# Patient Record
Sex: MALE | Race: WHITE | NOT HISPANIC OR LATINO
[De-identification: names, ages, dates, MRNs, and addresses within clinical notes are randomized per-mention and may not be internally consistent; named-entity substitution may affect disease eponyms.]

---

## 2019-02-07 ENCOUNTER — RECORD ABSTRACTING (OUTPATIENT)
Age: 39
End: 2019-02-07

## 2019-02-07 DIAGNOSIS — Z78.9 OTHER SPECIFIED HEALTH STATUS: ICD-10-CM

## 2019-02-07 DIAGNOSIS — Z86.19 PERSONAL HISTORY OF OTHER INFECTIOUS AND PARASITIC DISEASES: ICD-10-CM

## 2019-02-07 DIAGNOSIS — Z87.891 PERSONAL HISTORY OF NICOTINE DEPENDENCE: ICD-10-CM

## 2019-02-07 DIAGNOSIS — Z87.39 PERSONAL HISTORY OF OTHER DISEASES OF THE MUSCULOSKELETAL SYSTEM AND CONNECTIVE TISSUE: ICD-10-CM

## 2019-02-07 DIAGNOSIS — Z84.1 FAMILY HISTORY OF DISORDERS OF KIDNEY AND URETER: ICD-10-CM

## 2019-02-07 DIAGNOSIS — N20.0 CALCULUS OF KIDNEY: ICD-10-CM

## 2019-02-08 ENCOUNTER — APPOINTMENT (OUTPATIENT)
Dept: INTERNAL MEDICINE | Facility: CLINIC | Age: 39
End: 2019-02-08
Payer: COMMERCIAL

## 2019-02-08 ENCOUNTER — TRANSCRIPTION ENCOUNTER (OUTPATIENT)
Age: 39
End: 2019-02-08

## 2019-02-08 ENCOUNTER — RESULT CHARGE (OUTPATIENT)
Age: 39
End: 2019-02-08

## 2019-02-08 ENCOUNTER — NON-APPOINTMENT (OUTPATIENT)
Age: 39
End: 2019-02-08

## 2019-02-08 VITALS
HEIGHT: 72.75 IN | WEIGHT: 174 LBS | BODY MASS INDEX: 23.06 KG/M2 | HEART RATE: 80 BPM | SYSTOLIC BLOOD PRESSURE: 108 MMHG | DIASTOLIC BLOOD PRESSURE: 80 MMHG

## 2019-02-08 DIAGNOSIS — Z12.5 ENCOUNTER FOR SCREENING FOR MALIGNANT NEOPLASM OF PROSTATE: ICD-10-CM

## 2019-02-08 LAB
BILIRUB UR QL STRIP: NEGATIVE
CLARITY UR: CLEAR
COLLECTION METHOD: NORMAL
GLUCOSE UR-MCNC: NEGATIVE
HCG UR QL: 0.2 EU/DL
HGB UR QL STRIP.AUTO: NORMAL
KETONES UR-MCNC: NEGATIVE
LEUKOCYTE ESTERASE UR QL STRIP: NEGATIVE
NITRITE UR QL STRIP: NEGATIVE
PH UR STRIP: 5.5
PROT UR STRIP-MCNC: NEGATIVE
SP GR UR STRIP: 1.02

## 2019-02-08 PROCEDURE — 36415 COLL VENOUS BLD VENIPUNCTURE: CPT

## 2019-02-08 PROCEDURE — 81002 URINALYSIS NONAUTO W/O SCOPE: CPT

## 2019-02-08 PROCEDURE — 99396 PREV VISIT EST AGE 40-64: CPT | Mod: 25

## 2019-02-08 PROCEDURE — 93000 ELECTROCARDIOGRAM COMPLETE: CPT

## 2019-02-08 NOTE — COUNSELING
[Weight management counseling provided] : Weight management [Healthy eating counseling provided] : healthy eating [Activity counseling provided] : activity [de-identified] : patient counseled on    diet   importance of exercise and not smoking  regular dental care and periodic eye exams.  timing of when will be due for colonoscopy   discussed\par

## 2019-02-08 NOTE — PHYSICAL EXAM
[Normal] : normal gait, coordination grossly intact, no focal deficits [Normal Male:] : meatus normal, the bladder was normal on palpation, the scrotum was normal, there were no testicular masses and no prostate nodules. [de-identified] : uncircumsized

## 2019-02-08 NOTE — HEALTH RISK ASSESSMENT
[Good] : ~his/her~  mood as  good [No falls in past year] : Patient reported no falls in the past year [0] : 2) Feeling down, depressed, or hopeless: Not at all (0) [HIV test declined] : HIV test declined [None] : None [Fully functional (bathing, dressing, toileting, transferring, walking, feeding)] : Fully functional (bathing, dressing, toileting, transferring, walking, feeding) [Fully functional (using the telephone, shopping, preparing meals, housekeeping, doing laundry, using] : Fully functional and needs no help or supervision to perform IADLs (using the telephone, shopping, preparing meals, housekeeping, doing laundry, using transportation, managing medications and managing finances) [] : No [Change in mental status noted] : No change in mental status noted [Behavior] : denies difficulty with behavior [Learning/Retaining New Information] : denies difficulty learning/retaining new information [Handling Complex Tasks] : denies difficulty handling complex tasks [Reasoning] : denies difficulty with reasoning [Spatial Ability and Orientation] : denies difficulty with spatial ability and orientation [ColonoscopyDate] : never

## 2019-02-10 LAB
24R-OH-CALCIDIOL SERPL-MCNC: 57.1 PG/ML
ALBUMIN SERPL ELPH-MCNC: 4.8 G/DL
ALP BLD-CCNC: 61 U/L
ALT SERPL-CCNC: 25 U/L
ANION GAP SERPL CALC-SCNC: 13 MMOL/L
AST SERPL-CCNC: 16 U/L
BASOPHILS # BLD AUTO: 0.02 K/UL
BASOPHILS NFR BLD AUTO: 0.4 %
BILIRUB SERPL-MCNC: 0.8 MG/DL
BUN SERPL-MCNC: 26 MG/DL
CALCIUM SERPL-MCNC: 9.6 MG/DL
CHLORIDE SERPL-SCNC: 102 MMOL/L
CHOLEST SERPL-MCNC: 177 MG/DL
CHOLEST/HDLC SERPL: 3.9 RATIO
CO2 SERPL-SCNC: 24 MMOL/L
CREAT SERPL-MCNC: 1.24 MG/DL
EOSINOPHIL # BLD AUTO: 0.09 K/UL
EOSINOPHIL NFR BLD AUTO: 1.9 %
GLUCOSE SERPL-MCNC: 94 MG/DL
HCT VFR BLD CALC: 47.7 %
HDLC SERPL-MCNC: 45 MG/DL
HGB BLD-MCNC: 15.7 G/DL
IMM GRANULOCYTES NFR BLD AUTO: 0.2 %
LDLC SERPL CALC-MCNC: 114 MG/DL
LYMPHOCYTES # BLD AUTO: 1.74 K/UL
LYMPHOCYTES NFR BLD AUTO: 35.8 %
MAN DIFF?: NORMAL
MCHC RBC-ENTMCNC: 27.8 PG
MCHC RBC-ENTMCNC: 32.9 GM/DL
MCV RBC AUTO: 84.6 FL
MONOCYTES # BLD AUTO: 0.61 K/UL
MONOCYTES NFR BLD AUTO: 12.6 %
NEUTROPHILS # BLD AUTO: 2.39 K/UL
NEUTROPHILS NFR BLD AUTO: 49.1 %
PLATELET # BLD AUTO: 251 K/UL
POTASSIUM SERPL-SCNC: 4.4 MMOL/L
PROT SERPL-MCNC: 7.1 G/DL
PSA SERPL-MCNC: 0.65 NG/ML
RBC # BLD: 5.64 M/UL
RBC # FLD: 13.9 %
SODIUM SERPL-SCNC: 139 MMOL/L
TRIGL SERPL-MCNC: 89 MG/DL
WBC # FLD AUTO: 4.86 K/UL

## 2019-02-25 ENCOUNTER — APPOINTMENT (OUTPATIENT)
Dept: INTERNAL MEDICINE | Facility: CLINIC | Age: 39
End: 2019-02-25
Payer: COMMERCIAL

## 2019-02-25 PROCEDURE — 93306 TTE W/DOPPLER COMPLETE: CPT

## 2019-10-10 ENCOUNTER — APPOINTMENT (OUTPATIENT)
Dept: INTERNAL MEDICINE | Facility: CLINIC | Age: 39
End: 2019-10-10
Payer: COMMERCIAL

## 2019-10-10 VITALS
SYSTOLIC BLOOD PRESSURE: 119 MMHG | BODY MASS INDEX: 24.78 KG/M2 | HEART RATE: 68 BPM | DIASTOLIC BLOOD PRESSURE: 76 MMHG | WEIGHT: 187 LBS | HEIGHT: 73 IN

## 2019-10-10 DIAGNOSIS — Z01.818 ENCOUNTER FOR OTHER PREPROCEDURAL EXAMINATION: ICD-10-CM

## 2019-10-10 PROCEDURE — 99214 OFFICE O/P EST MOD 30 MIN: CPT

## 2019-10-10 NOTE — PHYSICAL EXAM
[No Acute Distress] : no acute distress [Well Nourished] : well nourished [Well-Appearing] : well-appearing [Well Developed] : well developed [Normal Sclera/Conjunctiva] : normal sclera/conjunctiva [PERRL] : pupils equal round and reactive to light [Normal Outer Ear/Nose] : the outer ears and nose were normal in appearance [EOMI] : extraocular movements intact [Normal Oropharynx] : the oropharynx was normal [No JVD] : no jugular venous distention [No Lymphadenopathy] : no lymphadenopathy [Supple] : supple [Thyroid Normal, No Nodules] : the thyroid was normal and there were no nodules present [No Respiratory Distress] : no respiratory distress  [No Accessory Muscle Use] : no accessory muscle use [Clear to Auscultation] : lungs were clear to auscultation bilaterally [Normal Rate] : normal rate  [Regular Rhythm] : with a regular rhythm [Normal S1, S2] : normal S1 and S2 [No Murmur] : no murmur heard [No Carotid Bruits] : no carotid bruits [No Abdominal Bruit] : a ~M bruit was not heard ~T in the abdomen [No Varicosities] : no varicosities [Pedal Pulses Present] : the pedal pulses are present [No Edema] : there was no peripheral edema [No Palpable Aorta] : no palpable aorta [No Extremity Clubbing/Cyanosis] : no extremity clubbing/cyanosis [Soft] : abdomen soft [Non Tender] : non-tender [Non-distended] : non-distended [No Masses] : no abdominal mass palpated [No HSM] : no HSM [Normal Posterior Cervical Nodes] : no posterior cervical lymphadenopathy [Normal Bowel Sounds] : normal bowel sounds [Normal Anterior Cervical Nodes] : no anterior cervical lymphadenopathy [No CVA Tenderness] : no CVA  tenderness [No Spinal Tenderness] : no spinal tenderness [No Joint Swelling] : no joint swelling [Grossly Normal Strength/Tone] : grossly normal strength/tone [No Rash] : no rash [Coordination Grossly Intact] : coordination grossly intact [No Focal Deficits] : no focal deficits [Normal Gait] : normal gait [Deep Tendon Reflexes (DTR)] : deep tendon reflexes were 2+ and symmetric [Normal Affect] : the affect was normal [Normal Insight/Judgement] : insight and judgment were intact

## 2019-10-10 NOTE — ASSESSMENT
[Patient Optimized for Surgery] : Patient optimized for surgery [No Further Testing Recommended] : no further testing recommended [FreeTextEntry4] : he is to have presurgical clearance at dr tovar's office on october 11, 2019

## 2019-10-10 NOTE — HISTORY OF PRESENT ILLNESS
[No Pertinent Cardiac History] : no history of aortic stenosis, atrial fibrillation, coronary artery disease, recent myocardial infarction, or implantable device/pacemaker [No Pertinent Pulmonary History] : no history of asthma, COPD, sleep apnea, or smoking [Self] : no previous adverse anesthesia reaction [Chronic Anticoagulation] : no chronic anticoagulation [Chronic Kidney Disease] : no chronic kidney disease [Diabetes] : no diabetes [FreeTextEntry1] : septoplasty [FreeTextEntry2] : october 17, 2019. [FreeTextEntry3] : dr barbara tovar

## 2019-12-03 ENCOUNTER — APPOINTMENT (OUTPATIENT)
Dept: CARDIOLOGY | Facility: CLINIC | Age: 39
End: 2019-12-03
Payer: COMMERCIAL

## 2019-12-03 ENCOUNTER — NON-APPOINTMENT (OUTPATIENT)
Age: 39
End: 2019-12-03

## 2019-12-03 VITALS
WEIGHT: 193 LBS | HEART RATE: 64 BPM | BODY MASS INDEX: 25.58 KG/M2 | HEIGHT: 73 IN | SYSTOLIC BLOOD PRESSURE: 110 MMHG | DIASTOLIC BLOOD PRESSURE: 80 MMHG

## 2019-12-03 PROCEDURE — 99204 OFFICE O/P NEW MOD 45 MIN: CPT

## 2019-12-03 PROCEDURE — 93000 ELECTROCARDIOGRAM COMPLETE: CPT

## 2019-12-03 NOTE — HISTORY OF PRESENT ILLNESS
[FreeTextEntry1] : 39M who presents for 2 episodes of chest pain, heart palpitations.\par \par First occurred while walking from coffee shop, took a sip of coffee, had severe chest pain, sharp, lasted a few moments but then HR was fluctuating per Apple Watch from 50s-140s. This lasted a few hours and then subsided. Had a repeat episode a few days later with chest pain, without the HR fluctuations. No associated SOB, nausea, diaphoresis. Pain nonradiating, nonexertional. Has never felt before. No other medical problems. Not under stress. Minimal alcohol use in general. \par \par Review of systems other than what is described above is negative. \par \par Father has an ICD, placed at 68. Former sporadic smoker. Owns an insurance agency. \par \par ECG: SR with iRBBB, LAE\par TTE: EF 65-70%, normal

## 2019-12-03 NOTE — PHYSICAL EXAM
[General Appearance - Well Developed] : well developed [Normal Appearance] : normal appearance [Well Groomed] : well groomed [General Appearance - In No Acute Distress] : no acute distress [No Deformities] : no deformities [General Appearance - Well Nourished] : well nourished [Eyelids - No Xanthelasma] : the eyelids demonstrated no xanthelasmas [Normal Conjunctiva] : the conjunctiva exhibited no abnormalities [Normal Oral Mucosa] : normal oral mucosa [No Oral Cyanosis] : no oral cyanosis [No Oral Pallor] : no oral pallor [Normal Jugular Venous A Waves Present] : normal jugular venous A waves present [Normal Jugular Venous V Waves Present] : normal jugular venous V waves present [Heart Sounds] : normal S1 and S2 [Heart Rate And Rhythm] : heart rate and rhythm were normal [No Jugular Venous Colbert A Waves] : no jugular venous colbert A waves [Murmurs] : no murmurs present [Auscultation Breath Sounds / Voice Sounds] : lungs were clear to auscultation bilaterally [Respiration, Rhythm And Depth] : normal respiratory rhythm and effort [Abdomen Soft] : soft [Exaggerated Use Of Accessory Muscles For Inspiration] : no accessory muscle use [Abdomen Mass (___ Cm)] : no abdominal mass palpated [Abdomen Tenderness] : non-tender [Gait - Sufficient For Exercise Testing] : the gait was sufficient for exercise testing [Abnormal Walk] : normal gait [Cyanosis, Localized] : no localized cyanosis [Petechial Hemorrhages (___cm)] : no petechial hemorrhages [Nail Clubbing] : no clubbing of the fingernails [] : no rash [Skin Color & Pigmentation] : normal skin color and pigmentation [Skin Lesions] : no skin lesions [No Skin Ulcers] : no skin ulcer [No Venous Stasis] : no venous stasis [No Xanthoma] : no  xanthoma was observed [Affect] : the affect was normal [Oriented To Time, Place, And Person] : oriented to person, place, and time [Mood] : the mood was normal [No Anxiety] : not feeling anxious

## 2019-12-03 NOTE — DISCUSSION/SUMMARY
[FreeTextEntry1] : 39M with episodes of chest discomfort, fluctuations in HR with palpitations. Had normal Echo in February. Baseline ECG changes unchanged. He has few risk factors, but would be reasonable to rule out ischemia\par \par -Check exercise stress test\par -Can consider event monitoring based on EST results, we will get some HR information from the EST\par \par He will return based on his stress test results

## 2019-12-03 NOTE — REVIEW OF SYSTEMS
[Chest Pain] : chest pain [Palpitations] : palpitations [Fever] : no fever [Blurry Vision] : no blurred vision [Eyeglasses] : not currently wearing eyeglasses [Chills] : no chills [Abdominal Pain] : no abdominal pain [Cough] : no cough [Shortness Of Breath] : no shortness of breath [Urinary Frequency] : no change in urinary frequency [Dysphagia] : no dysphagia [Heartburn] : no heartburn [Joint Pain] : no joint pain [Impotence] : no impotence [Muscle Cramps] : no muscle cramps [Convulsions] : no convulsions [Dizziness] : no dizziness [Skin: A Rash] : no rash: [Skin Lesions] : no skin lesions

## 2019-12-10 ENCOUNTER — APPOINTMENT (OUTPATIENT)
Dept: CARDIOLOGY | Facility: CLINIC | Age: 39
End: 2019-12-10

## 2020-04-20 ENCOUNTER — TRANSCRIPTION ENCOUNTER (OUTPATIENT)
Age: 40
End: 2020-04-20

## 2020-12-10 ENCOUNTER — NON-APPOINTMENT (OUTPATIENT)
Age: 40
End: 2020-12-10

## 2020-12-10 ENCOUNTER — APPOINTMENT (OUTPATIENT)
Dept: CARDIOLOGY | Facility: CLINIC | Age: 40
End: 2020-12-10
Payer: COMMERCIAL

## 2020-12-10 VITALS — SYSTOLIC BLOOD PRESSURE: 111 MMHG | HEART RATE: 83 BPM | DIASTOLIC BLOOD PRESSURE: 81 MMHG

## 2020-12-10 PROCEDURE — 99214 OFFICE O/P EST MOD 30 MIN: CPT

## 2020-12-10 PROCEDURE — 99072 ADDL SUPL MATRL&STAF TM PHE: CPT

## 2020-12-10 PROCEDURE — 93000 ELECTROCARDIOGRAM COMPLETE: CPT

## 2020-12-10 NOTE — PHYSICAL EXAM
[General Appearance - Well Developed] : well developed [Normal Appearance] : normal appearance [Well Groomed] : well groomed [General Appearance - Well Nourished] : well nourished [No Deformities] : no deformities [General Appearance - In No Acute Distress] : no acute distress [Normal Conjunctiva] : the conjunctiva exhibited no abnormalities [Eyelids - No Xanthelasma] : the eyelids demonstrated no xanthelasmas [Normal Oral Mucosa] : normal oral mucosa [No Oral Pallor] : no oral pallor [No Oral Cyanosis] : no oral cyanosis [Normal Jugular Venous A Waves Present] : normal jugular venous A waves present [Normal Jugular Venous V Waves Present] : normal jugular venous V waves present [No Jugular Venous Colbert A Waves] : no jugular venous colbert A waves [Respiration, Rhythm And Depth] : normal respiratory rhythm and effort [Exaggerated Use Of Accessory Muscles For Inspiration] : no accessory muscle use [Auscultation Breath Sounds / Voice Sounds] : lungs were clear to auscultation bilaterally [Heart Rate And Rhythm] : heart rate and rhythm were normal [Heart Sounds] : normal S1 and S2 [Murmurs] : no murmurs present [Abdomen Soft] : soft [Abdomen Tenderness] : non-tender [Abdomen Mass (___ Cm)] : no abdominal mass palpated [Abnormal Walk] : normal gait [Gait - Sufficient For Exercise Testing] : the gait was sufficient for exercise testing [Nail Clubbing] : no clubbing of the fingernails [Cyanosis, Localized] : no localized cyanosis [Petechial Hemorrhages (___cm)] : no petechial hemorrhages [Skin Color & Pigmentation] : normal skin color and pigmentation [] : no rash [No Venous Stasis] : no venous stasis [Skin Lesions] : no skin lesions [No Skin Ulcers] : no skin ulcer [No Xanthoma] : no  xanthoma was observed [Oriented To Time, Place, And Person] : oriented to person, place, and time [Affect] : the affect was normal [Mood] : the mood was normal [No Anxiety] : not feeling anxious

## 2020-12-10 NOTE — DISCUSSION/SUMMARY
[FreeTextEntry1] : 40M with episodes of chest discomfort, fluctuations in HR with palpitations\par \par Never got EST, will plan\par May be anxiety component to symptoms\par Consider event monitoring based on EST results\par Blood work with PCP pending \par

## 2020-12-10 NOTE — HISTORY OF PRESENT ILLNESS
[FreeTextEntry1] : 40M who presents for follow up of palpitations, chest burning\par \par Last seen 12/3/19 for similar episodes. Worse over last few months\par Notes intermittent sensation of chest burning, mostly in the evenings\par HR on Apple Watch 40s-160s\par Recent episode after walking up a flight of stairs. Sometimes exertional, sometimes nonexertional.\par Has not been exercising as much as he had been prior\par No known COVID hx\par Was scheduled for EST but never had one\par \par Review of systems other than what is described above is negative. \par \par Father has an ICD, placed at 68. Former sporadic smoker. Owns an insurance agency. \par \par ECG: SR with iRBBB, LAE\par TTE: EF 65-70%, normal

## 2020-12-10 NOTE — REVIEW OF SYSTEMS
[Chest Pain] : chest pain [Palpitations] : palpitations [Fever] : no fever [Chills] : no chills [Blurry Vision] : no blurred vision [Eyeglasses] : not currently wearing eyeglasses [Shortness Of Breath] : no shortness of breath [Cough] : no cough [Abdominal Pain] : no abdominal pain [Heartburn] : no heartburn [Dysphagia] : no dysphagia [Urinary Frequency] : no change in urinary frequency [Impotence] : no impotence [Joint Pain] : no joint pain [Muscle Cramps] : no muscle cramps [Skin: A Rash] : no rash: [Skin Lesions] : no skin lesions [Dizziness] : no dizziness [Convulsions] : no convulsions

## 2020-12-24 ENCOUNTER — APPOINTMENT (OUTPATIENT)
Dept: CARDIOLOGY | Facility: CLINIC | Age: 40
End: 2020-12-24
Payer: COMMERCIAL

## 2020-12-24 VITALS
BODY MASS INDEX: 24.52 KG/M2 | HEART RATE: 61 BPM | WEIGHT: 185 LBS | HEIGHT: 73 IN | DIASTOLIC BLOOD PRESSURE: 84 MMHG | SYSTOLIC BLOOD PRESSURE: 134 MMHG

## 2020-12-24 PROCEDURE — 93015 CV STRESS TEST SUPVJ I&R: CPT

## 2020-12-24 PROCEDURE — 99072 ADDL SUPL MATRL&STAF TM PHE: CPT

## 2020-12-24 PROCEDURE — 99214 OFFICE O/P EST MOD 30 MIN: CPT | Mod: 25

## 2021-01-13 ENCOUNTER — NON-APPOINTMENT (OUTPATIENT)
Age: 41
End: 2021-01-13

## 2021-01-29 ENCOUNTER — APPOINTMENT (OUTPATIENT)
Dept: ELECTROPHYSIOLOGY | Facility: CLINIC | Age: 41
End: 2021-01-29
Payer: COMMERCIAL

## 2021-01-29 VITALS
SYSTOLIC BLOOD PRESSURE: 145 MMHG | DIASTOLIC BLOOD PRESSURE: 88 MMHG | WEIGHT: 182 LBS | HEART RATE: 110 BPM | BODY MASS INDEX: 24.12 KG/M2 | OXYGEN SATURATION: 100 % | HEIGHT: 73 IN

## 2021-01-29 DIAGNOSIS — G43.909 MIGRAINE, UNSPECIFIED, NOT INTRACTABLE, W/OUT STATUS MIGRAINOSUS: ICD-10-CM

## 2021-01-29 PROCEDURE — 99205 OFFICE O/P NEW HI 60 MIN: CPT

## 2021-01-29 PROCEDURE — 93000 ELECTROCARDIOGRAM COMPLETE: CPT

## 2021-01-29 PROCEDURE — 99072 ADDL SUPL MATRL&STAF TM PHE: CPT

## 2021-01-29 RX ORDER — METOPROLOL SUCCINATE 25 MG/1
25 TABLET, EXTENDED RELEASE ORAL DAILY
Qty: 90 | Refills: 1 | Status: DISCONTINUED | COMMUNITY
Start: 2021-01-13 | End: 2021-01-29

## 2021-01-29 RX ORDER — RIZATRIPTAN BENZOATE 10 MG/1
10 TABLET, ORALLY DISINTEGRATING ORAL
Refills: 0 | Status: DISCONTINUED | COMMUNITY
End: 2021-01-29

## 2021-01-29 NOTE — PHYSICAL EXAM
[General Appearance - Well Developed] : well developed [Normal Appearance] : normal appearance [Well Groomed] : well groomed [General Appearance - Well Nourished] : well nourished [No Deformities] : no deformities [General Appearance - In No Acute Distress] : no acute distress [Normal Conjunctiva] : the conjunctiva exhibited no abnormalities [Eyelids - No Xanthelasma] : the eyelids demonstrated no xanthelasmas [Normal Oral Mucosa] : normal oral mucosa [No Oral Pallor] : no oral pallor [No Oral Cyanosis] : no oral cyanosis [Normal Jugular Venous A Waves Present] : normal jugular venous A waves present [Normal Jugular Venous V Waves Present] : normal jugular venous V waves present [No Jugular Venous Colbert A Waves] : no jugular venous colbert A waves [Heart Rate And Rhythm] : heart rate and rhythm were normal [Heart Sounds] : normal S1 and S2 [Murmurs] : no murmurs present [Respiration, Rhythm And Depth] : normal respiratory rhythm and effort [Exaggerated Use Of Accessory Muscles For Inspiration] : no accessory muscle use [Auscultation Breath Sounds / Voice Sounds] : lungs were clear to auscultation bilaterally [Abdomen Soft] : soft [Abdomen Tenderness] : non-tender [Abdomen Mass (___ Cm)] : no abdominal mass palpated [Abnormal Walk] : normal gait [Gait - Sufficient For Exercise Testing] : the gait was sufficient for exercise testing [Nail Clubbing] : no clubbing of the fingernails [Cyanosis, Localized] : no localized cyanosis [Petechial Hemorrhages (___cm)] : no petechial hemorrhages [Skin Color & Pigmentation] : normal skin color and pigmentation [] : no rash [No Venous Stasis] : no venous stasis [Skin Lesions] : no skin lesions [No Skin Ulcers] : no skin ulcer [No Xanthoma] : no  xanthoma was observed [Oriented To Time, Place, And Person] : oriented to person, place, and time [Affect] : the affect was normal [No Anxiety] : not feeling anxious [Mood] : the mood was normal

## 2021-01-29 NOTE — ASSESSMENT
[FreeTextEntry1] : This is a 40-year-old man with newly diagnosed paroxysmal atrial fibrillation his symptoms have improved with beta-blocker therapy but he continues to have episodes.  He has avoiding caffeine and alcohol, exercises regularly.  Of note from the Zio patch he is having episodes of atrial fibrillation that started during sleep and and shortly later after awakening in the mornings.  He also reports awakening in the morning with migraine headaches.\par \par HIs [id number] score is 0. \par \par At this time would recommend evaluation with a sleep study as I suspect sleep apnea may be a trigger for his atrial fibrillation.\par \par Discussed rate control versus rhythm strategy in the shared decision model and after careful discussion it was a shared decision to take a rhythm control approach.  We will therefore start an antiarrhythmic while other evaluation is ongoing.  Discussed the option of catheter ablation as well.\par \par Discussed the various options of continued monitoring of atrial fib burden with him including sequential Holter monitors, wearable event monitors and an implantable loop recorder.  Have recommended implantable loop recorder as the best long-term option for monitoring his A. fib burden.

## 2021-01-29 NOTE — HISTORY OF PRESENT ILLNESS
[FreeTextEntry1] : This is a 40-year-old man who presents for evaluation of palpitations and atrial fibrillation found on a 7-day monitor.  He reports that he has been having palpitations since mid 2019.  He notes the palpitations were occasionally associated with chest pains.  He wears a apple watch and noted variations in heart rate from the 50s to the 170s. JAVIER YOONPRESTONEMA  denies  shortness of breath, lightheadedness, dizziness,   syncope, presyncope, orthopnea, PND, or edema.  He wore a Zio patch for 7 days that demonstrated paroxysmal atrial fibrillation.  The longest interval was at 1 hour 54 minutes heart rate ranged from 72 to 262 bpm with an average heart rate of 151 bpm and an A. fib burden of 5%.  He was started on beta-blockers which have improved his symptoms, but he is still having breakthrough events.  Review of the Zio patch recording demonstrates that his atrial fibrillation begins predominantly during times of sleep.  He also notes that he awakes from sleep frequently with headaches.  He is unsure if he snores.

## 2021-02-02 ENCOUNTER — APPOINTMENT (OUTPATIENT)
Dept: DISASTER EMERGENCY | Facility: CLINIC | Age: 41
End: 2021-02-02

## 2021-02-03 LAB — SARS-COV-2 N GENE NPH QL NAA+PROBE: NOT DETECTED

## 2021-02-05 ENCOUNTER — OUTPATIENT (OUTPATIENT)
Dept: OUTPATIENT SERVICES | Facility: HOSPITAL | Age: 41
LOS: 1 days | Discharge: HOME | End: 2021-02-05
Payer: COMMERCIAL

## 2021-02-05 VITALS
TEMPERATURE: 97 F | SYSTOLIC BLOOD PRESSURE: 138 MMHG | WEIGHT: 182.98 LBS | DIASTOLIC BLOOD PRESSURE: 91 MMHG | RESPIRATION RATE: 17 BRPM | HEIGHT: 73 IN | OXYGEN SATURATION: 100 % | HEART RATE: 73 BPM

## 2021-02-05 VITALS
OXYGEN SATURATION: 100 % | TEMPERATURE: 98 F | RESPIRATION RATE: 17 BRPM | DIASTOLIC BLOOD PRESSURE: 84 MMHG | HEART RATE: 71 BPM | SYSTOLIC BLOOD PRESSURE: 130 MMHG

## 2021-02-05 DIAGNOSIS — I48.0 PAROXYSMAL ATRIAL FIBRILLATION: ICD-10-CM

## 2021-02-05 PROCEDURE — 33285 INSJ SUBQ CAR RHYTHM MNTR: CPT

## 2021-02-05 PROCEDURE — C1764: CPT

## 2021-02-05 RX ORDER — PROPAFENONE HCL 150 MG
1 TABLET ORAL
Qty: 0 | Refills: 0 | DISCHARGE
Start: 2021-02-05

## 2021-02-05 NOTE — PACU DISCHARGE NOTE - COMMENTS
Pt tolerated well pt verbalizes understanding of post op care Monitor paired with device by rep.  Education provided regarding home use.  Pt and/or family verbalizes understanding.  Device and ID card given to pt/family.

## 2021-02-05 NOTE — BEDSIDE PROCEDURE TIME OUT CHECKLIST - NSPOSTCOMMENTSFT_GEN_ALL_CORE
Pt tolerated well site sutured dermaband applied site no swelling bleeding noted maintained safety pt denies any discomfort

## 2021-02-05 NOTE — ASU PREOP CHECKLIST - WARM FLUIDS/WARM BLANKETS
Avoid prolonged standing or sitting without elevating your legs.    - Multilayer compression wrap to left leg. Do not get wet and keep on for the week. Only remove if temperature or sensation changes.   If compression needs to be removed, un-wrap it do not cut it off.     Should you experience any significant changes in your wound(s), such as infection (redness, swelling, localized heat, increased pain, fever > 101 F, chills) or have any questions regarding your home care instructions, please contact the wound center at [phone]. If after hours, contact your primary care physician or go to the hospital emergency room.   Keep dressing clean, dry and covered while bathing. Only change dressing if it becomes over saturated, soiled or falls off.      no

## 2021-02-07 DIAGNOSIS — R00.2 PALPITATIONS: ICD-10-CM

## 2021-02-09 ENCOUNTER — APPOINTMENT (OUTPATIENT)
Dept: INTERNAL MEDICINE | Facility: CLINIC | Age: 41
End: 2021-02-09
Payer: COMMERCIAL

## 2021-02-09 ENCOUNTER — NON-APPOINTMENT (OUTPATIENT)
Age: 41
End: 2021-02-09

## 2021-02-09 VITALS
WEIGHT: 189 LBS | TEMPERATURE: 97.8 F | HEART RATE: 78 BPM | HEIGHT: 73 IN | OXYGEN SATURATION: 96 % | DIASTOLIC BLOOD PRESSURE: 79 MMHG | BODY MASS INDEX: 25.05 KG/M2 | SYSTOLIC BLOOD PRESSURE: 140 MMHG

## 2021-02-09 DIAGNOSIS — Z80.0 FAMILY HISTORY OF MALIGNANT NEOPLASM OF DIGESTIVE ORGANS: ICD-10-CM

## 2021-02-09 DIAGNOSIS — R31.29 OTHER MICROSCOPIC HEMATURIA: ICD-10-CM

## 2021-02-09 DIAGNOSIS — Z00.00 ENCOUNTER FOR GENERAL ADULT MEDICAL EXAMINATION W/OUT ABNORMAL FINDINGS: ICD-10-CM

## 2021-02-09 LAB
BILIRUB UR QL STRIP: NORMAL
CLARITY UR: CLEAR
COLLECTION METHOD: NORMAL
GLUCOSE UR-MCNC: NORMAL
HCG UR QL: 0.2 EU/DL
HGB UR QL STRIP.AUTO: NORMAL
KETONES UR-MCNC: NORMAL
LEUKOCYTE ESTERASE UR QL STRIP: NORMAL
NITRITE UR QL STRIP: NORMAL
PH UR STRIP: 6
PROT UR STRIP-MCNC: NORMAL
SP GR UR STRIP: 1.02

## 2021-02-09 PROCEDURE — 99072 ADDL SUPL MATRL&STAF TM PHE: CPT

## 2021-02-09 PROCEDURE — 99396 PREV VISIT EST AGE 40-64: CPT | Mod: 25

## 2021-02-09 PROCEDURE — 36415 COLL VENOUS BLD VENIPUNCTURE: CPT

## 2021-02-09 PROCEDURE — 81003 URINALYSIS AUTO W/O SCOPE: CPT | Mod: QW

## 2021-02-09 PROCEDURE — G0444 DEPRESSION SCREEN ANNUAL: CPT | Mod: NC,59

## 2021-02-09 PROCEDURE — 93000 ELECTROCARDIOGRAM COMPLETE: CPT | Mod: 59

## 2021-02-09 NOTE — HEALTH RISK ASSESSMENT
[Good] : ~his/her~  mood as  good [No] : No [0] : 2) Feeling down, depressed, or hopeless: Not at all (0) [] : No [de-identified] : less exercise

## 2021-02-09 NOTE — REVIEW OF SYSTEMS
[Negative] : Heme/Lymph [Palpitations] : palpitations [Dizziness] : dizziness [de-identified] : when he is in fib

## 2021-02-10 LAB
ALBUMIN SERPL ELPH-MCNC: 4.7 G/DL
ALP BLD-CCNC: 80 U/L
ALT SERPL-CCNC: 30 U/L
ANION GAP SERPL CALC-SCNC: 15 MMOL/L
APPEARANCE: CLEAR
AST SERPL-CCNC: 18 U/L
BACTERIA: NEGATIVE
BASOPHILS # BLD AUTO: 0.02 K/UL
BASOPHILS NFR BLD AUTO: 0.3 %
BILIRUB SERPL-MCNC: 0.5 MG/DL
BILIRUBIN URINE: NEGATIVE
BLOOD URINE: NEGATIVE
BUN SERPL-MCNC: 26 MG/DL
CALCIUM SERPL-MCNC: 9.7 MG/DL
CHLORIDE SERPL-SCNC: 103 MMOL/L
CHOLEST SERPL-MCNC: 184 MG/DL
CO2 SERPL-SCNC: 22 MMOL/L
COLOR: NORMAL
CREAT SERPL-MCNC: 1.33 MG/DL
EOSINOPHIL # BLD AUTO: 0.15 K/UL
EOSINOPHIL NFR BLD AUTO: 2.4 %
GLUCOSE QUALITATIVE U: NEGATIVE
GLUCOSE SERPL-MCNC: 95 MG/DL
HCT VFR BLD CALC: 45.5 %
HDLC SERPL-MCNC: 45 MG/DL
HGB BLD-MCNC: 15.1 G/DL
HYALINE CASTS: 0 /LPF
IMM GRANULOCYTES NFR BLD AUTO: 0.5 %
KETONES URINE: NEGATIVE
LDLC SERPL CALC-MCNC: 80 MG/DL
LEUKOCYTE ESTERASE URINE: NEGATIVE
LYMPHOCYTES # BLD AUTO: 2.08 K/UL
LYMPHOCYTES NFR BLD AUTO: 33.4 %
MAN DIFF?: NORMAL
MCHC RBC-ENTMCNC: 27.9 PG
MCHC RBC-ENTMCNC: 33.2 GM/DL
MCV RBC AUTO: 83.9 FL
MICROSCOPIC-UA: NORMAL
MONOCYTES # BLD AUTO: 0.53 K/UL
MONOCYTES NFR BLD AUTO: 8.5 %
NEUTROPHILS # BLD AUTO: 3.42 K/UL
NEUTROPHILS NFR BLD AUTO: 54.9 %
NITRITE URINE: NEGATIVE
NONHDLC SERPL-MCNC: 138 MG/DL
PH URINE: 5.5
PLATELET # BLD AUTO: 229 K/UL
POTASSIUM SERPL-SCNC: 4.3 MMOL/L
PROT SERPL-MCNC: 7.1 G/DL
PROTEIN URINE: NEGATIVE
RBC # BLD: 5.42 M/UL
RBC # FLD: 13.2 %
RED BLOOD CELLS URINE: 0 /HPF
SODIUM SERPL-SCNC: 141 MMOL/L
SPECIFIC GRAVITY URINE: 1.02
SQUAMOUS EPITHELIAL CELLS: 0 /HPF
TRIGL SERPL-MCNC: 294 MG/DL
UROBILINOGEN URINE: NORMAL
WBC # FLD AUTO: 6.23 K/UL
WHITE BLOOD CELLS URINE: 0 /HPF

## 2021-02-26 ENCOUNTER — APPOINTMENT (OUTPATIENT)
Dept: ELECTROPHYSIOLOGY | Facility: CLINIC | Age: 41
End: 2021-02-26
Payer: COMMERCIAL

## 2021-02-26 VITALS
HEART RATE: 75 BPM | BODY MASS INDEX: 24.65 KG/M2 | WEIGHT: 186 LBS | OXYGEN SATURATION: 100 % | SYSTOLIC BLOOD PRESSURE: 106 MMHG | DIASTOLIC BLOOD PRESSURE: 77 MMHG | HEIGHT: 73 IN

## 2021-02-26 PROCEDURE — 99072 ADDL SUPL MATRL&STAF TM PHE: CPT

## 2021-02-26 PROCEDURE — 93285 PRGRMG DEV EVAL SCRMS IP: CPT

## 2021-03-01 ENCOUNTER — APPOINTMENT (OUTPATIENT)
Dept: ELECTROPHYSIOLOGY | Facility: CLINIC | Age: 41
End: 2021-03-01
Payer: COMMERCIAL

## 2021-03-01 VITALS
BODY MASS INDEX: 24.65 KG/M2 | DIASTOLIC BLOOD PRESSURE: 79 MMHG | HEART RATE: 70 BPM | SYSTOLIC BLOOD PRESSURE: 128 MMHG | WEIGHT: 186 LBS | OXYGEN SATURATION: 100 % | HEIGHT: 73 IN

## 2021-03-01 DIAGNOSIS — Z80.9 FAMILY HISTORY OF MALIGNANT NEOPLASM, UNSPECIFIED: ICD-10-CM

## 2021-03-01 DIAGNOSIS — Z78.9 OTHER SPECIFIED HEALTH STATUS: ICD-10-CM

## 2021-03-01 DIAGNOSIS — Z83.3 FAMILY HISTORY OF DIABETES MELLITUS: ICD-10-CM

## 2021-03-01 PROCEDURE — 99072 ADDL SUPL MATRL&STAF TM PHE: CPT

## 2021-03-01 PROCEDURE — 99214 OFFICE O/P EST MOD 30 MIN: CPT | Mod: 25

## 2021-03-01 PROCEDURE — 93285 PRGRMG DEV EVAL SCRMS IP: CPT

## 2021-03-01 NOTE — REASON FOR VISIT
[Initial Evaluation] : an initial evaluation of [Atrial Fibrillation] : atrial fibrillation [FreeTextEntry1] : ref Dr Red

## 2021-03-01 NOTE — PHYSICAL EXAM
[Normal Appearance] : normal appearance [Well Groomed] : well groomed [Normal Oral Mucosa] : normal oral mucosa [Heart Rate And Rhythm] : heart rate and rhythm were normal [Heart Sounds] : normal S1 and S2 [] : no respiratory distress [Respiration, Rhythm And Depth] : normal respiratory rhythm and effort [Bowel Sounds] : normal bowel sounds [Abdomen Soft] : soft [Abnormal Walk] : normal gait [Nail Clubbing] : no clubbing of the fingernails [Skin Color & Pigmentation] : normal skin color and pigmentation [Skin Turgor] : normal skin turgor [Oriented To Time, Place, And Person] : oriented to person, place, and time [Impaired Insight] : insight and judgment were intact

## 2021-03-01 NOTE — REVIEW OF SYSTEMS
[Palpitations] : palpitations [Negative] : Heme/Lymph [Shortness Of Breath] : no shortness of breath [Dyspnea on exertion] : not dyspnea during exertion [Chest Pain] : no chest pain [Cough] : no cough

## 2021-03-01 NOTE — DISCUSSION/SUMMARY
[FreeTextEntry1] : 40-year-old gentleman with history of paroxysmal atrial fibrillation symptomatic drug refractory currently on propafenone, prior stress test and TTE showed no evidence of structural HD. \par Some AF episodes with RVR will start toprolol xl as well for rate control\par Rhythm control strategy is beneficial for symptomatic AF. I discussed both antiarrhythmic drugs and catheter ablation procedure option. Ablation procedure is good option for this patient as a more effective therapeutic option. Discussed i/r/b/a of each treatment option and all questions were answered.\par -encouraged lifestyle modification exercise, weight loss, reduce alcohol intake, SCOTT work up and treatment \par -Cardiac CT\par -lone AF chadsvasc zero, will start xarelto 20mg qD for short term and stop it 2 months post ablation. \par \par Total length of time spent with this patient was 50 minutes and more then half of the time was spent face to face with the patient as well as counseling and coordination of care.\par

## 2021-03-01 NOTE — HISTORY OF PRESENT ILLNESS
[FreeTextEntry1] : 40-year-old gentleman with history of migraine HA, paroxysmal atrial fibrillation who has palpitations since mid 2019. His father has AF and CHF. Patient underwent ILR insertion to monitor AF burden he has been on metoprolol and recently was started on propafenone 225 every 12 and continues to have breakthrough AF episodes with RVR to 180-200 bpm AF burden 4%.  Prior Zio patch also shows AF episodes more during sleep. Pt feels palpitations frequently lasintg up to 15 minutes, AF burden is less based on symptoms since he started propafenone but still continues to have bothersome palpitations a/w light headed. He may drink only 1 glass of wine /week. Denies smoking.

## 2021-04-05 DIAGNOSIS — Z01.818 ENCOUNTER FOR OTHER PREPROCEDURAL EXAMINATION: ICD-10-CM

## 2021-04-05 LAB
ANION GAP SERPL CALC-SCNC: 9 MMOL/L
BUN SERPL-MCNC: 24 MG/DL
CALCIUM SERPL-MCNC: 9.5 MG/DL
CHLORIDE SERPL-SCNC: 104 MMOL/L
CO2 SERPL-SCNC: 28 MMOL/L
CREAT SERPL-MCNC: 1.27 MG/DL
GLUCOSE SERPL-MCNC: 88 MG/DL
POTASSIUM SERPL-SCNC: 4.7 MMOL/L
SODIUM SERPL-SCNC: 141 MMOL/L

## 2021-04-07 ENCOUNTER — OUTPATIENT (OUTPATIENT)
Dept: OUTPATIENT SERVICES | Facility: HOSPITAL | Age: 41
LOS: 1 days | End: 2021-04-07
Payer: COMMERCIAL

## 2021-04-07 DIAGNOSIS — I48.0 PAROXYSMAL ATRIAL FIBRILLATION: ICD-10-CM

## 2021-04-07 PROCEDURE — 75572 CT HRT W/3D IMAGE: CPT

## 2021-04-07 PROCEDURE — 75572 CT HRT W/3D IMAGE: CPT | Mod: 26

## 2021-04-11 ENCOUNTER — APPOINTMENT (OUTPATIENT)
Dept: DISASTER EMERGENCY | Facility: CLINIC | Age: 41
End: 2021-04-11

## 2021-04-11 DIAGNOSIS — Z01.818 ENCOUNTER FOR OTHER PREPROCEDURAL EXAMINATION: ICD-10-CM

## 2021-04-12 ENCOUNTER — APPOINTMENT (OUTPATIENT)
Dept: ELECTROPHYSIOLOGY | Facility: CLINIC | Age: 41
End: 2021-04-12
Payer: COMMERCIAL

## 2021-04-12 ENCOUNTER — NON-APPOINTMENT (OUTPATIENT)
Age: 41
End: 2021-04-12

## 2021-04-12 LAB — SARS-COV-2 N GENE NPH QL NAA+PROBE: NOT DETECTED

## 2021-04-13 ENCOUNTER — OUTPATIENT (OUTPATIENT)
Dept: OUTPATIENT SERVICES | Facility: HOSPITAL | Age: 41
LOS: 1 days | End: 2021-04-13
Payer: COMMERCIAL

## 2021-04-13 VITALS
RESPIRATION RATE: 16 BRPM | TEMPERATURE: 98 F | HEART RATE: 53 BPM | DIASTOLIC BLOOD PRESSURE: 86 MMHG | OXYGEN SATURATION: 99 % | SYSTOLIC BLOOD PRESSURE: 144 MMHG

## 2021-04-13 VITALS
HEART RATE: 53 BPM | OXYGEN SATURATION: 99 % | HEIGHT: 73 IN | DIASTOLIC BLOOD PRESSURE: 86 MMHG | SYSTOLIC BLOOD PRESSURE: 144 MMHG | RESPIRATION RATE: 18 BRPM | TEMPERATURE: 98 F | WEIGHT: 184.97 LBS

## 2021-04-13 DIAGNOSIS — Z01.818 ENCOUNTER FOR OTHER PREPROCEDURAL EXAMINATION: ICD-10-CM

## 2021-04-13 LAB
ANION GAP SERPL CALC-SCNC: 10 MMOL/L — SIGNIFICANT CHANGE UP (ref 5–17)
APTT BLD: 46.8 SEC — HIGH (ref 27.5–35.5)
BLD GP AB SCN SERPL QL: SIGNIFICANT CHANGE UP
BUN SERPL-MCNC: 19 MG/DL — SIGNIFICANT CHANGE UP (ref 8–20)
CALCIUM SERPL-MCNC: 9.3 MG/DL — SIGNIFICANT CHANGE UP (ref 8.6–10.2)
CHLORIDE SERPL-SCNC: 103 MMOL/L — SIGNIFICANT CHANGE UP (ref 98–107)
CO2 SERPL-SCNC: 28 MMOL/L — SIGNIFICANT CHANGE UP (ref 22–29)
CREAT SERPL-MCNC: 1.05 MG/DL — SIGNIFICANT CHANGE UP (ref 0.5–1.3)
GLUCOSE SERPL-MCNC: 92 MG/DL — SIGNIFICANT CHANGE UP (ref 70–99)
HCT VFR BLD CALC: 46 % — SIGNIFICANT CHANGE UP (ref 39–50)
HGB BLD-MCNC: 15.7 G/DL — SIGNIFICANT CHANGE UP (ref 13–17)
INR BLD: 1.96 RATIO — HIGH (ref 0.88–1.16)
MAGNESIUM SERPL-MCNC: 2 MG/DL — SIGNIFICANT CHANGE UP (ref 1.6–2.6)
MCHC RBC-ENTMCNC: 28.2 PG — SIGNIFICANT CHANGE UP (ref 27–34)
MCHC RBC-ENTMCNC: 34.1 GM/DL — SIGNIFICANT CHANGE UP (ref 32–36)
MCV RBC AUTO: 82.7 FL — SIGNIFICANT CHANGE UP (ref 80–100)
PLATELET # BLD AUTO: 191 K/UL — SIGNIFICANT CHANGE UP (ref 150–400)
POTASSIUM SERPL-MCNC: 4.3 MMOL/L — SIGNIFICANT CHANGE UP (ref 3.5–5.3)
POTASSIUM SERPL-SCNC: 4.3 MMOL/L — SIGNIFICANT CHANGE UP (ref 3.5–5.3)
PROTHROM AB SERPL-ACNC: 22 SEC — HIGH (ref 10.6–13.6)
RBC # BLD: 5.56 M/UL — SIGNIFICANT CHANGE UP (ref 4.2–5.8)
RBC # FLD: 12.9 % — SIGNIFICANT CHANGE UP (ref 10.3–14.5)
SODIUM SERPL-SCNC: 141 MMOL/L — SIGNIFICANT CHANGE UP (ref 135–145)
WBC # BLD: 6.12 K/UL — SIGNIFICANT CHANGE UP (ref 3.8–10.5)
WBC # FLD AUTO: 6.12 K/UL — SIGNIFICANT CHANGE UP (ref 3.8–10.5)

## 2021-04-13 PROCEDURE — 86901 BLOOD TYPING SEROLOGIC RH(D): CPT

## 2021-04-13 PROCEDURE — 93298 REM INTERROG DEV EVAL SCRMS: CPT

## 2021-04-13 PROCEDURE — 86850 RBC ANTIBODY SCREEN: CPT

## 2021-04-13 PROCEDURE — 36415 COLL VENOUS BLD VENIPUNCTURE: CPT

## 2021-04-13 PROCEDURE — G0463: CPT

## 2021-04-13 PROCEDURE — 83735 ASSAY OF MAGNESIUM: CPT

## 2021-04-13 PROCEDURE — 86900 BLOOD TYPING SEROLOGIC ABO: CPT

## 2021-04-13 PROCEDURE — 85610 PROTHROMBIN TIME: CPT

## 2021-04-13 PROCEDURE — 85027 COMPLETE CBC AUTOMATED: CPT

## 2021-04-13 PROCEDURE — 80048 BASIC METABOLIC PNL TOTAL CA: CPT

## 2021-04-13 PROCEDURE — 93005 ELECTROCARDIOGRAM TRACING: CPT

## 2021-04-13 PROCEDURE — G2066: CPT

## 2021-04-13 PROCEDURE — 85730 THROMBOPLASTIN TIME PARTIAL: CPT

## 2021-04-13 PROCEDURE — 93010 ELECTROCARDIOGRAM REPORT: CPT

## 2021-04-13 RX ORDER — PROPAFENONE HCL 150 MG
0 TABLET ORAL
Qty: 0 | Refills: 0 | DISCHARGE

## 2021-04-13 NOTE — H&P PST ADULT - HISTORY OF PRESENT ILLNESS
40-year-old gentleman with history of symptomatic, drug refractory, paroxysmal atrial fibrillation currently on propafenone, Toprol & Xarelto who presents today for PST for elective AF ablation scheduled for 21.   DALI deferred as Cardiac CT 21 was negative for ANALY thrombus.    EK21, sinus incomplete RBBB   Stress Test: 2020, no Ischemia, no exercise induced arrhythmias, no Symptoms   Echo: 2019, trace MR, mild TR, normal LV function, no pulmonary hypertension, normal LA size LVEF 65-70%.   40-year-old gentleman with history of symptomatic, drug refractory, paroxysmal atrial fibrillation (dx 2019) currently on propafenone, Toprol & Xarelto who presents today for PST for elective AF ablation scheduled for 21.   DALI deferred as Cardiac CT 21 was negative for ANALY thrombus.    EK21, sinus incomplete RBBB   Stress Test: 2020, no Ischemia, no exercise induced arrhythmias, no Symptoms   Echo: 2019, trace MR, mild TR, normal LV function, no pulmonary hypertension, normal LA size LVEF 65-70%.

## 2021-04-13 NOTE — H&P PST ADULT - ASSESSMENT
40-year-old gentleman with history of symptomatic, drug refractory, paroxysmal atrial fibrillation currently on propafenone, Toprol & Xarelto who presents today for PST for elective AF ablation scheduled for 4/14/21.   DALI deferred as Cardiac CT 4/7/21 was negative for ANALY thrombus.    - npo after midnight   - COVID PCR negative 4/11/21, continue to self isolate  - continue xarelto tonight   -encouraged lifestyle modification exercise, weight loss, reduce alcohol intake, SCOTT work up and treatment

## 2021-04-13 NOTE — H&P PST ADULT - NSANTHOSAYNRD_GEN_A_CORE
sleep study negative for SCOTT/No. SCOTT screening performed.  STOP BANG Legend: 0-2 = LOW Risk; 3-4 = INTERMEDIATE Risk; 5-8 = HIGH Risk

## 2021-04-14 ENCOUNTER — TRANSCRIPTION ENCOUNTER (OUTPATIENT)
Age: 41
End: 2021-04-14

## 2021-04-14 ENCOUNTER — INPATIENT (INPATIENT)
Facility: HOSPITAL | Age: 41
LOS: 0 days | Discharge: HOME | DRG: 274 | End: 2021-04-15
Attending: INTERNAL MEDICINE | Admitting: INTERNAL MEDICINE
Payer: COMMERCIAL

## 2021-04-14 VITALS
DIASTOLIC BLOOD PRESSURE: 80 MMHG | RESPIRATION RATE: 16 BRPM | TEMPERATURE: 98 F | HEART RATE: 68 BPM | SYSTOLIC BLOOD PRESSURE: 126 MMHG

## 2021-04-14 DIAGNOSIS — I48.0 PAROXYSMAL ATRIAL FIBRILLATION: ICD-10-CM

## 2021-04-14 LAB — ABO RH CONFIRMATION: SIGNIFICANT CHANGE UP

## 2021-04-14 PROCEDURE — 93010 ELECTROCARDIOGRAM REPORT: CPT

## 2021-04-14 PROCEDURE — 99236 HOSP IP/OBS SAME DATE HI 85: CPT

## 2021-04-14 PROCEDURE — 99024 POSTOP FOLLOW-UP VISIT: CPT

## 2021-04-14 RX ORDER — METOPROLOL TARTRATE 50 MG
25 TABLET ORAL DAILY
Refills: 0 | Status: DISCONTINUED | OUTPATIENT
Start: 2021-04-14 | End: 2021-04-15

## 2021-04-14 RX ORDER — ASPIRIN/CALCIUM CARB/MAGNESIUM 324 MG
81 TABLET ORAL DAILY
Refills: 0 | Status: DISCONTINUED | OUTPATIENT
Start: 2021-04-14 | End: 2021-04-15

## 2021-04-14 RX ORDER — ALPRAZOLAM 0.25 MG
0.25 TABLET ORAL EVERY 6 HOURS
Refills: 0 | Status: DISCONTINUED | OUTPATIENT
Start: 2021-04-14 | End: 2021-04-15

## 2021-04-14 RX ORDER — ACETAMINOPHEN 500 MG
650 TABLET ORAL EVERY 6 HOURS
Refills: 0 | Status: DISCONTINUED | OUTPATIENT
Start: 2021-04-14 | End: 2021-04-15

## 2021-04-14 RX ORDER — PANTOPRAZOLE SODIUM 20 MG/1
40 TABLET, DELAYED RELEASE ORAL
Refills: 0 | Status: DISCONTINUED | OUTPATIENT
Start: 2021-04-14 | End: 2021-04-15

## 2021-04-14 RX ORDER — OXYCODONE AND ACETAMINOPHEN 5; 325 MG/1; MG/1
1 TABLET ORAL EVERY 6 HOURS
Refills: 0 | Status: DISCONTINUED | OUTPATIENT
Start: 2021-04-14 | End: 2021-04-15

## 2021-04-14 RX ORDER — SUCRALFATE 1 G
1 TABLET ORAL
Refills: 0 | Status: DISCONTINUED | OUTPATIENT
Start: 2021-04-14 | End: 2021-04-15

## 2021-04-14 RX ORDER — COLCHICINE 0.6 MG
0.6 TABLET ORAL DAILY
Refills: 0 | Status: DISCONTINUED | OUTPATIENT
Start: 2021-04-14 | End: 2021-04-15

## 2021-04-14 RX ORDER — METOPROLOL TARTRATE 50 MG
1 TABLET ORAL
Qty: 0 | Refills: 0 | DISCHARGE

## 2021-04-14 RX ORDER — RIVAROXABAN 15 MG-20MG
1 KIT ORAL
Qty: 0 | Refills: 0 | DISCHARGE

## 2021-04-14 RX ORDER — BENZOCAINE AND MENTHOL 5; 1 G/100ML; G/100ML
1 LIQUID ORAL
Refills: 0 | Status: DISCONTINUED | OUTPATIENT
Start: 2021-04-14 | End: 2021-04-15

## 2021-04-14 RX ORDER — PROPAFENONE HCL 150 MG
225 TABLET ORAL EVERY 12 HOURS
Refills: 0 | Status: DISCONTINUED | OUTPATIENT
Start: 2021-04-14 | End: 2021-04-15

## 2021-04-14 RX ORDER — RIVAROXABAN 15 MG-20MG
20 KIT ORAL DAILY
Refills: 0 | Status: DISCONTINUED | OUTPATIENT
Start: 2021-04-14 | End: 2021-04-15

## 2021-04-14 RX ADMIN — PANTOPRAZOLE SODIUM 40 MILLIGRAM(S): 20 TABLET, DELAYED RELEASE ORAL at 20:48

## 2021-04-14 RX ADMIN — RIVAROXABAN 20 MILLIGRAM(S): KIT at 20:48

## 2021-04-14 RX ADMIN — Medication 225 MILLIGRAM(S): at 20:50

## 2021-04-14 RX ADMIN — Medication 1 GRAM(S): at 20:48

## 2021-04-14 RX ADMIN — Medication 25 MILLIGRAM(S): at 22:37

## 2021-04-14 RX ADMIN — Medication 0.6 MILLIGRAM(S): at 20:47

## 2021-04-14 RX ADMIN — Medication 650 MILLIGRAM(S): at 23:36

## 2021-04-14 NOTE — DISCHARGE NOTE PROVIDER - HOSPITAL COURSE
40-year-old gentleman with history of symptomatic, drug refractory, paroxysmal atrial fibrillation currently on propafenone, Toprol & Xarelto.  He presented electively on 4/14/21 and is now POD#1 s/p radiofrequency ablation of atrial fibrillation ([facility] PVI).  DALI deferred as Cardiac CT 4/7/21 was negative for ANALY thrombus.  The patient was observed overnight without event and was discharged home the following morning with a plan for outpatient follow up.

## 2021-04-14 NOTE — DISCHARGE NOTE PROVIDER - NSDCFUSCHEDAPPT_GEN_ALL_CORE_FT
JAVIER WILKINS ; 05/18/2021 ; [facility] [address] JAVIER [address] ; 05/18/2021 ; [facility] [address]

## 2021-04-14 NOTE — PROGRESS NOTE ADULT - SUBJECTIVE AND OBJECTIVE BOX
Pt presents today for elective AFib ablation.  PST done 4/13/21, denies any interval changes. Confirms NPO > 8 hrs. Complaint with Xarelto. Cardiac CT 4/7/21 was negative for ANALY thrombus.    Plan:  - keep NPO   - confirmatory type and screen  - 2 units prbc on hold  - consent w/Dr. Cole

## 2021-04-14 NOTE — DISCHARGE NOTE PROVIDER - NSDCMRMEDTOKEN_GEN_ALL_CORE_FT
aspirin 81 mg oral delayed release tablet: 1 tab(s) orally once a day  Metoprolol Succinate ER 25 mg oral tablet, extended release: 1 tab(s) orally once a day  propafenone 225 mg oral capsule, extended release: 1 cap(s) orally every 12 hours  Xarelto 20 mg oral tablet: 1 tab(s) orally once a day (in the evening)   colchicine 0.6 mg oral tablet: 1 tab(s) orally once a day for 30 days then STOP  Metoprolol Succinate ER 25 mg oral tablet, extended release: 1 tab(s) orally once a day  pantoprazole 40 mg oral delayed release tablet: 1 tab(s) orally 2 times a day for 14 days then once daily for 6 weeks then STOP   propafenone 225 mg oral capsule, extended release: 1 cap(s) orally every 12 hours  sucralfate 1 g/10 mL oral suspension: 10 milliliter(s) orally 2 times a day for 14 days then STOP   Xarelto 20 mg oral tablet: 1 tab(s) orally once a day (in the evening)

## 2021-04-14 NOTE — PROGRESS NOTE ADULT - SUBJECTIVE AND OBJECTIVE BOX
PROCEDURE(S): Radiofrequency Ablation of Atrial Fibrillation    ELECTROPHYSIOLOGIST(S): Jeanmarie Cole MD         COMPLICATIONS:  none        DISPOSITION: observation      CONDITION: stable    Pt doing well s/p elective radiofrequency atrial fibrillation ablation ([facility] PVI) via b/l femoral vein access.  Vascade MVP utilzied for venous hemostasis.  Pt denies complaint post procedure.     MEDICATIONS  (STANDING):  aspirin enteric coated 81 milliGRAM(s) Oral daily  colchicine 0.6 milliGRAM(s) Oral daily  metoprolol succinate ER 25 milliGRAM(s) Oral daily  pantoprazole    Tablet 40 milliGRAM(s) Oral two times a day  propafenone 225 milliGRAM(s) Oral every 12 hours  rivaroxaban 20 milliGRAM(s) Oral daily  sucralfate suspension 1 Gram(s) Oral two times a day    MEDICATIONS  (PRN):  acetaminophen   Tablet .. 650 milliGRAM(s) Oral every 6 hours PRN Mild Pain (1 - 3), Moderate Pain (4 - 6)  ALPRAZolam 0.25 milliGRAM(s) Oral every 6 hours PRN anxiety/insomnia  benzocaine 15 mG/menthol 3.6 mG (Sugar-Free) Lozenge 1 Lozenge Oral every 2 hours PRN Sore Throat  oxycodone    5 mG/acetaminophen 325 mG 1 Tablet(s) Oral every 6 hours PRN Severe Pain (7 - 10)    Allergies:  No Known Allergies    VS:   T(C): 36.6 (04-14-21 @ 11:46), Max: 36.6 (04-14-21 @ 11:46)  HR: 64 (04-14-21 @ 17:55) (64 - 68)  BP: 106/66 (04-14-21 @ 17:55) (106/66 - 126/80)  RR: 16 (04-14-21 @ 17:55) (16 - 16)  SpO2: 99% (04-14-21 @ 17:55) (99% - 99%)  Post-procedure VS: /69 HR 63 O2 sat 99% RR 16     Physical exam:   awake, alert, no obvious distress  Card: S1/S2, RRR, no m/g/r  Resp: lungs CTA b/l  Abd: S/NT/ND  Groins: sites C/D/I; no bleeding, hematoma, erythema, exudate or edema  Ext: no edema; distal pulses intact    ECG: NSR 62 bpm, RBBB     Assessment:   40-year-old gentleman with history of symptomatic, drug refractory, paroxysmal atrial fibrillation currently on propafenone, Toprol & Xarelto.  He presented electively on 4/14/21 and is now status post uncomplicated radiofrequency ablation of atrial fibrillation ([facility] PVI).  DALI deferred as Cardiac CT 4/7/21 was negative for ANALY thrombus.  Resting comfortably.      Plan:   Bedrest x 2 hours, then OOB with assistance and progress as tolerated.   Radial art line to be removed once pt fully awake with stable vitals >1 hour.    Pending groin status: Xarelto 20mg PO daily @ 21:00   Continue propafenone and metoprolol X 1 month, will consider discontinuing at follow up in no recurrence of AF.   Start Protonix 40mg BID x 2 weeks then daily X 6 weeks.     Carafate 1gm BID x 2 weeks.   Colchicine 0.6mg PO daily X 30 days, as tolerated.    Strict I/Os.  Please encourage incentive spirometry and ambulation once able.  Observation and monitoring on telemetry overnight with anticipated discharge in the AM and outpt follow up in 1 month.   PROCEDURE(S): Radiofrequency Ablation of Atrial Fibrillation    ELECTROPHYSIOLOGIST(S): Jeanmarie Cole MD         COMPLICATIONS:  none        DISPOSITION: observation      CONDITION: stable    Pt doing well s/p elective radiofrequency atrial fibrillation ablation ([facility] PVI) via b/l femoral vein access.  Vascade MVP utilzied for venous hemostasis.  Pt denies complaint post procedure.     MEDICATIONS  (STANDING):  aspirin enteric coated 81 milliGRAM(s) Oral daily  colchicine 0.6 milliGRAM(s) Oral daily  metoprolol succinate ER 25 milliGRAM(s) Oral daily  pantoprazole    Tablet 40 milliGRAM(s) Oral two times a day  propafenone 225 milliGRAM(s) Oral every 12 hours  rivaroxaban 20 milliGRAM(s) Oral daily  sucralfate suspension 1 Gram(s) Oral two times a day    MEDICATIONS  (PRN):  acetaminophen   Tablet .. 650 milliGRAM(s) Oral every 6 hours PRN Mild Pain (1 - 3), Moderate Pain (4 - 6)  ALPRAZolam 0.25 milliGRAM(s) Oral every 6 hours PRN anxiety/insomnia  benzocaine 15 mG/menthol 3.6 mG (Sugar-Free) Lozenge 1 Lozenge Oral every 2 hours PRN Sore Throat  oxycodone    5 mG/acetaminophen 325 mG 1 Tablet(s) Oral every 6 hours PRN Severe Pain (7 - 10)    Allergies:  No Known Allergies    VS:   T(C): 36.6 (04-14-21 @ 11:46), Max: 36.6 (04-14-21 @ 11:46)  HR: 64 (04-14-21 @ 17:55) (64 - 68)  BP: 106/66 (04-14-21 @ 17:55) (106/66 - 126/80)  RR: 16 (04-14-21 @ 17:55) (16 - 16)  SpO2: 99% (04-14-21 @ 17:55) (99% - 99%)  Post-procedure VS: /69 HR 63 O2 sat 99% RR 16     Physical exam:   awake, alert, no obvious distress  Card: S1/S2, RRR, no m/g/r  Resp: lungs CTA b/l  Abd: S/NT/ND  Groins: sites C/D/I; no bleeding, hematoma, erythema, exudate or edema  Ext: no edema; distal pulses intact    ECG: NSR 62 bpm, RBBB

## 2021-04-14 NOTE — DISCHARGE NOTE PROVIDER - CARE PROVIDER_API CALL
Jeanmarie Cole)  Cardiac Electrophysiology; Cardiovascular Disease; Internal Medicine  [address] [place] [address]  Phone: [phone]  Fax: [phone]  Follow Up Time:

## 2021-04-15 ENCOUNTER — TRANSCRIPTION ENCOUNTER (OUTPATIENT)
Age: 41
End: 2021-04-15

## 2021-04-15 ENCOUNTER — NON-APPOINTMENT (OUTPATIENT)
Age: 41
End: 2021-04-15

## 2021-04-15 VITALS
OXYGEN SATURATION: 98 % | SYSTOLIC BLOOD PRESSURE: 112 MMHG | RESPIRATION RATE: 17 BRPM | DIASTOLIC BLOOD PRESSURE: 77 MMHG

## 2021-04-15 LAB
ANION GAP SERPL CALC-SCNC: 11 MMOL/L — SIGNIFICANT CHANGE UP (ref 5–17)
BUN SERPL-MCNC: 22 MG/DL — HIGH (ref 8–20)
CALCIUM SERPL-MCNC: 8.7 MG/DL — SIGNIFICANT CHANGE UP (ref 8.6–10.2)
CHLORIDE SERPL-SCNC: 102 MMOL/L — SIGNIFICANT CHANGE UP (ref 98–107)
CO2 SERPL-SCNC: 23 MMOL/L — SIGNIFICANT CHANGE UP (ref 22–29)
COVID-19 SPIKE DOMAIN AB INTERP: NEGATIVE — SIGNIFICANT CHANGE UP
COVID-19 SPIKE DOMAIN ANTIBODY RESULT: 0.4 U/ML — SIGNIFICANT CHANGE UP
CREAT SERPL-MCNC: 1.12 MG/DL — SIGNIFICANT CHANGE UP (ref 0.5–1.3)
GLUCOSE SERPL-MCNC: 108 MG/DL — HIGH (ref 70–99)
HCT VFR BLD CALC: 42.1 % — SIGNIFICANT CHANGE UP (ref 39–50)
HGB BLD-MCNC: 14.5 G/DL — SIGNIFICANT CHANGE UP (ref 13–17)
MAGNESIUM SERPL-MCNC: 1.9 MG/DL — SIGNIFICANT CHANGE UP (ref 1.6–2.6)
MCHC RBC-ENTMCNC: 28.3 PG — SIGNIFICANT CHANGE UP (ref 27–34)
MCHC RBC-ENTMCNC: 34.4 GM/DL — SIGNIFICANT CHANGE UP (ref 32–36)
MCV RBC AUTO: 82.2 FL — SIGNIFICANT CHANGE UP (ref 80–100)
PLATELET # BLD AUTO: 206 K/UL — SIGNIFICANT CHANGE UP (ref 150–400)
POTASSIUM SERPL-MCNC: 4.1 MMOL/L — SIGNIFICANT CHANGE UP (ref 3.5–5.3)
POTASSIUM SERPL-SCNC: 4.1 MMOL/L — SIGNIFICANT CHANGE UP (ref 3.5–5.3)
RBC # BLD: 5.12 M/UL — SIGNIFICANT CHANGE UP (ref 4.2–5.8)
RBC # FLD: 12.7 % — SIGNIFICANT CHANGE UP (ref 10.3–14.5)
SARS-COV-2 IGG+IGM SERPL QL IA: 0.4 U/ML — SIGNIFICANT CHANGE UP
SARS-COV-2 IGG+IGM SERPL QL IA: NEGATIVE — SIGNIFICANT CHANGE UP
SODIUM SERPL-SCNC: 136 MMOL/L — SIGNIFICANT CHANGE UP (ref 135–145)
WBC # BLD: 12.91 K/UL — HIGH (ref 3.8–10.5)
WBC # FLD AUTO: 12.91 K/UL — HIGH (ref 3.8–10.5)

## 2021-04-15 PROCEDURE — 80048 BASIC METABOLIC PNL TOTAL CA: CPT

## 2021-04-15 PROCEDURE — C1894: CPT

## 2021-04-15 PROCEDURE — 93613 INTRACARDIAC EPHYS 3D MAPG: CPT

## 2021-04-15 PROCEDURE — 93657 TX L/R ATRIAL FIB ADDL: CPT

## 2021-04-15 PROCEDURE — C1766: CPT

## 2021-04-15 PROCEDURE — 93005 ELECTROCARDIOGRAM TRACING: CPT

## 2021-04-15 PROCEDURE — C1731: CPT

## 2021-04-15 PROCEDURE — 99236 HOSP IP/OBS SAME DATE HI 85: CPT

## 2021-04-15 PROCEDURE — 93010 ELECTROCARDIOGRAM REPORT: CPT

## 2021-04-15 PROCEDURE — C1732: CPT

## 2021-04-15 PROCEDURE — 93662 INTRACARDIAC ECG (ICE): CPT

## 2021-04-15 PROCEDURE — C1889: CPT

## 2021-04-15 PROCEDURE — 83735 ASSAY OF MAGNESIUM: CPT

## 2021-04-15 PROCEDURE — 86769 SARS-COV-2 COVID-19 ANTIBODY: CPT

## 2021-04-15 PROCEDURE — C1760: CPT

## 2021-04-15 PROCEDURE — 36415 COLL VENOUS BLD VENIPUNCTURE: CPT

## 2021-04-15 PROCEDURE — 93656 COMPRE EP EVAL ABLTJ ATR FIB: CPT

## 2021-04-15 PROCEDURE — C1759: CPT

## 2021-04-15 PROCEDURE — 85027 COMPLETE CBC AUTOMATED: CPT

## 2021-04-15 RX ORDER — PANTOPRAZOLE SODIUM 20 MG/1
1 TABLET, DELAYED RELEASE ORAL
Qty: 70 | Refills: 0
Start: 2021-04-15

## 2021-04-15 RX ORDER — SUCRALFATE 1 G
10 TABLET ORAL
Qty: 280 | Refills: 0
Start: 2021-04-15 | End: 2021-04-28

## 2021-04-15 RX ORDER — COLCHICINE 0.6 MG
1 TABLET ORAL
Qty: 30 | Refills: 0
Start: 2021-04-15 | End: 2021-05-14

## 2021-04-15 RX ORDER — ASPIRIN/CALCIUM CARB/MAGNESIUM 324 MG
1 TABLET ORAL
Qty: 0 | Refills: 0 | DISCHARGE

## 2021-04-15 RX ADMIN — Medication 650 MILLIGRAM(S): at 00:30

## 2021-04-15 RX ADMIN — Medication 225 MILLIGRAM(S): at 05:09

## 2021-04-15 RX ADMIN — Medication 1 GRAM(S): at 05:10

## 2021-04-15 RX ADMIN — PANTOPRAZOLE SODIUM 40 MILLIGRAM(S): 20 TABLET, DELAYED RELEASE ORAL at 05:09

## 2021-04-15 NOTE — PROGRESS NOTE ADULT - SUBJECTIVE AND OBJECTIVE BOX
Pt doing well POD #1 s/p AF ablation ([facility] PVI).  No overnight events noted, pt denies complaint today.       EKG: pending   TELE: sinus rhythm, no events noted     MEDICATIONS  (STANDING):  aspirin enteric coated 81 milliGRAM(s) Oral daily  colchicine 0.6 milliGRAM(s) Oral daily  metoprolol succinate ER 25 milliGRAM(s) Oral daily  pantoprazole    Tablet 40 milliGRAM(s) Oral two times a day  propafenone 225 milliGRAM(s) Oral every 12 hours  rivaroxaban 20 milliGRAM(s) Oral daily  sucralfate suspension 1 Gram(s) Oral two times a day    MEDICATIONS  (PRN):  acetaminophen   Tablet .. 650 milliGRAM(s) Oral every 6 hours PRN Mild Pain (1 - 3), Moderate Pain (4 - 6)  ALPRAZolam 0.25 milliGRAM(s) Oral every 6 hours PRN anxiety/insomnia  benzocaine 15 mG/menthol 3.6 mG (Sugar-Free) Lozenge 1 Lozenge Oral every 2 hours PRN Sore Throat  oxycodone    5 mG/acetaminophen 325 mG 1 Tablet(s) Oral every 6 hours PRN Severe Pain (7 - 10)    Allergies:  No Known Allergies    PAST MEDICAL & SURGICAL HISTORY:  Paroxysmal atrial fibrillation    Vital Signs Last 24 Hrs  T(C): 36.7 (15 Apr 2021 04:45), Max: 36.7 (15 Apr 2021 04:45)  T(F): 98.1 (15 Apr 2021 04:45), Max: 98.1 (15 Apr 2021 04:45)  HR: 79 (15 Apr 2021 04:45) (61 - 85)  BP: 111/71 (15 Apr 2021 04:45) (106/66 - 155/88)  RR: 17 (15 Apr 2021 04:45) (16 - 17)  SpO2: 96% (15 Apr 2021 04:45) (95% - 100%)    Physical Exam:  Constitutional: NAD, AAOx3  Cardiovascular: +S1S2 RRR  Pulmonary: CTA b/l, unlabored  Abd: soft NTND +BS  Groins: C/D/I bilaterally; no bleeding, hematoma, edema  Extremities: no pedal edema, +distal pulses b/l  Neuro: non focal, MCCLENDON x4    LABS:                        14.5   12.91 )-----------( 206      ( 15 Apr 2021 05:12 )             42.1     04-15    136  |  102  |  22.0<H>  ----------------------------<  108<H>  4.1   |  23.0  |  1.12    Ca    8.7      15 Apr 2021 05:12  Mg     1.9     04-15      PT/INR - ( 13 Apr 2021 09:40 )   PT: 22.0 sec;   INR: 1.96 ratio       PTT - ( 13 Apr 2021 09:40 )  PTT:46.8 sec    Assessment:   40-year-old gentleman with history of symptomatic, drug refractory, paroxysmal atrial fibrillation currently on propafenone, Toprol & Xarelto.  He presented electively on 4/14/21 and is now POD#1 s/p radiofrequency ablation of atrial fibrillation ([facility] PVI).  DALI deferred as Cardiac CT 4/7/21 was negative for ANALY thrombus.  No overnight events, resting comfortably.      Plan:   Xarelto 20mg PO daily   Continue propafenone and metoprolol X 1 month, will consider discontinuing at follow up in no recurrence of AF.   Start Protonix 40mg BID x 2 weeks then daily X 6 weeks.     Carafate 1gm BID x 2 weeks.   Colchicine 0.6mg PO daily X 30 days, as tolerated.    Access site care and activity limitations reviewed w/ pt.   Outpt f/up in 2-4 weeks.  Pt doing well POD #1 s/p AF ablation ([facility] PVI).  No overnight events noted, pt denies complaint today.       EKG: sinus rhythm, few PVCs    TELE: sinus rhythm, no events noted     MEDICATIONS  (STANDING):  aspirin enteric coated 81 milliGRAM(s) Oral daily  colchicine 0.6 milliGRAM(s) Oral daily  metoprolol succinate ER 25 milliGRAM(s) Oral daily  pantoprazole    Tablet 40 milliGRAM(s) Oral two times a day  propafenone 225 milliGRAM(s) Oral every 12 hours  rivaroxaban 20 milliGRAM(s) Oral daily  sucralfate suspension 1 Gram(s) Oral two times a day    MEDICATIONS  (PRN):  acetaminophen   Tablet .. 650 milliGRAM(s) Oral every 6 hours PRN Mild Pain (1 - 3), Moderate Pain (4 - 6)  ALPRAZolam 0.25 milliGRAM(s) Oral every 6 hours PRN anxiety/insomnia  benzocaine 15 mG/menthol 3.6 mG (Sugar-Free) Lozenge 1 Lozenge Oral every 2 hours PRN Sore Throat  oxycodone    5 mG/acetaminophen 325 mG 1 Tablet(s) Oral every 6 hours PRN Severe Pain (7 - 10)    Allergies:  No Known Allergies    PAST MEDICAL & SURGICAL HISTORY:  Paroxysmal atrial fibrillation    Vital Signs Last 24 Hrs  T(C): 36.7 (15 Apr 2021 04:45), Max: 36.7 (15 Apr 2021 04:45)  T(F): 98.1 (15 Apr 2021 04:45), Max: 98.1 (15 Apr 2021 04:45)  HR: 79 (15 Apr 2021 04:45) (61 - 85)  BP: 111/71 (15 Apr 2021 04:45) (106/66 - 155/88)  RR: 17 (15 Apr 2021 04:45) (16 - 17)  SpO2: 96% (15 Apr 2021 04:45) (95% - 100%)    Physical Exam:  Constitutional: NAD, AAOx3  Cardiovascular: +S1S2 RRR  Pulmonary: CTA b/l, unlabored  Abd: soft NTND +BS  Groins: C/D/I bilaterally; no bleeding, hematoma, edema  Extremities: no pedal edema, +distal pulses b/l  Neuro: non focal, MCCLENDON x4    LABS:                        14.5   12.91 )-----------( 206      ( 15 Apr 2021 05:12 )             42.1     04-15    136  |  102  |  22.0<H>  ----------------------------<  108<H>  4.1   |  23.0  |  1.12    Ca    8.7      15 Apr 2021 05:12  Mg     1.9     04-15      PT/INR - ( 13 Apr 2021 09:40 )   PT: 22.0 sec;   INR: 1.96 ratio       PTT - ( 13 Apr 2021 09:40 )  PTT:46.8 sec    Assessment:   40-year-old gentleman with history of symptomatic, drug refractory, paroxysmal atrial fibrillation currently on propafenone, Toprol & Xarelto.  He presented electively on 4/14/21 and is now POD#1 s/p radiofrequency ablation of atrial fibrillation ([facility] PVI).  DALI deferred as Cardiac CT 4/7/21 was negative for ANALY thrombus.  No overnight events, resting comfortably.      Plan:   Xarelto 20mg PO daily   Continue propafenone and metoprolol X 1 month, will consider discontinuing at follow up in no recurrence of AF.   Start Protonix 40mg BID x 2 weeks then daily X 6 weeks.     Carafate 1gm BID x 2 weeks.   Colchicine 0.6mg PO daily X 30 days, as tolerated.    Access site care and activity limitations reviewed w/ pt.   Outpt f/up in 2-4 weeks.  Pt doing well POD #1 s/p AF ablation ([facility] PVI).  No overnight events noted, pt denies complaint today.       EKG: sinus rhythm, few PVCs    TELE: sinus rhythm, no events noted     MEDICATIONS  (STANDING):  aspirin enteric coated 81 milliGRAM(s) Oral daily  colchicine 0.6 milliGRAM(s) Oral daily  metoprolol succinate ER 25 milliGRAM(s) Oral daily  pantoprazole    Tablet 40 milliGRAM(s) Oral two times a day  propafenone 225 milliGRAM(s) Oral every 12 hours  rivaroxaban 20 milliGRAM(s) Oral daily  sucralfate suspension 1 Gram(s) Oral two times a day    MEDICATIONS  (PRN):  acetaminophen   Tablet .. 650 milliGRAM(s) Oral every 6 hours PRN Mild Pain (1 - 3), Moderate Pain (4 - 6)  ALPRAZolam 0.25 milliGRAM(s) Oral every 6 hours PRN anxiety/insomnia  benzocaine 15 mG/menthol 3.6 mG (Sugar-Free) Lozenge 1 Lozenge Oral every 2 hours PRN Sore Throat  oxycodone    5 mG/acetaminophen 325 mG 1 Tablet(s) Oral every 6 hours PRN Severe Pain (7 - 10)    Allergies:  No Known Allergies    PAST MEDICAL & SURGICAL HISTORY:  Paroxysmal atrial fibrillation    Vital Signs Last 24 Hrs  T(C): 36.7 (15 Apr 2021 04:45), Max: 36.7 (15 Apr 2021 04:45)  T(F): 98.1 (15 Apr 2021 04:45), Max: 98.1 (15 Apr 2021 04:45)  HR: 79 (15 Apr 2021 04:45) (61 - 85)  BP: 111/71 (15 Apr 2021 04:45) (106/66 - 155/88)  RR: 17 (15 Apr 2021 04:45) (16 - 17)  SpO2: 96% (15 Apr 2021 04:45) (95% - 100%)    Physical Exam:  Constitutional: NAD, AAOx3  Cardiovascular: +S1S2 RRR  Pulmonary: CTA b/l, unlabored  Abd: soft NTND +BS  Groins: C/D/I bilaterally; no bleeding, hematoma, edema  Extremities: no pedal edema, +distal pulses b/l  Neuro: non focal, MCCLENDON x4    LABS:                        14.5   12.91 )-----------( 206      ( 15 Apr 2021 05:12 )             42.1     04-15    136  |  102  |  22.0<H>  ----------------------------<  108<H>  4.1   |  23.0  |  1.12    Ca    8.7      15 Apr 2021 05:12  Mg     1.9     04-15      PT/INR - ( 13 Apr 2021 09:40 )   PT: 22.0 sec;   INR: 1.96 ratio       PTT - ( 13 Apr 2021 09:40 )  PTT:46.8 sec

## 2021-04-15 NOTE — DISCHARGE NOTE NURSING/CASE MANAGEMENT/SOCIAL WORK - PATIENT PORTAL LINK FT
You can access the FollowMyHealth Patient Portal offered by [facility] by registering at the following website: [email or web address]. By joining [employer]’s FollowMyHealth portal, you will also be able to view your health information using other applications (apps) compatible with our system.

## 2021-04-15 NOTE — PROGRESS NOTE ADULT - ASSESSMENT
Assessment:   40-year-old gentleman with history of symptomatic, drug refractory, paroxysmal atrial fibrillation currently on propafenone, Toprol & Xarelto.  He presented electively on 4/14/21 and is now POD#1 s/p radiofrequency ablation of atrial fibrillation ([facility] PVI).  DALI deferred as Cardiac CT 4/7/21 was negative for ANALY thrombus.  No overnight events, resting comfortably.      Plan:   Xarelto 20mg PO daily   Continue propafenone and metoprolol X 1 month, will consider discontinuing at follow up in no recurrence of AF.   Start Protonix 40mg BID x 2 weeks then daily X 6 weeks.     Carafate 1gm BID x 2 weeks.   Colchicine 0.6mg PO daily X 30 days, as tolerated.    Access site care and activity limitations reviewed w/ pt.   Outpt f/up in 2-4 weeks. 
Assessment:   40-year-old gentleman with history of symptomatic, drug refractory, paroxysmal atrial fibrillation currently on propafenone, Toprol & Xarelto.  He presented electively on 4/14/21 and is now status post uncomplicated radiofrequency ablation of atrial fibrillation ([facility] PVI).  DALI deferred as Cardiac CT 4/7/21 was negative for ANALY thrombus.  Resting comfortably.      Plan:   Bedrest x 2 hours, then OOB with assistance and progress as tolerated.   Radial art line to be removed once pt fully awake with stable vitals >1 hour.    Pending groin status: Xarelto 20mg PO daily @ 21:00   Continue propafenone and metoprolol X 1 month, will consider discontinuing at follow up in no recurrence of AF.   Start Protonix 40mg BID x 2 weeks then daily X 6 weeks.     Carafate 1gm BID x 2 weeks.   Colchicine 0.6mg PO daily X 30 days, as tolerated.    Strict I/Os.  Please encourage incentive spirometry and ambulation once able.  Observation and monitoring on telemetry overnight with anticipated discharge in the AM and outpt follow up in 1 month.

## 2021-04-15 NOTE — PROGRESS NOTE ADULT - ATTENDING COMMENTS
s/p RFCA for paroxysmal afib, successful isolation of PV via WACA. Pt doing well post op, monitor overnight.
s/p RFCA for AF PV isolation via WACA. Pt is doping well d/c home today continue current meds

## 2021-05-17 ENCOUNTER — APPOINTMENT (OUTPATIENT)
Dept: ELECTROPHYSIOLOGY | Facility: CLINIC | Age: 41
End: 2021-05-17
Payer: COMMERCIAL

## 2021-05-18 ENCOUNTER — NON-APPOINTMENT (OUTPATIENT)
Age: 41
End: 2021-05-18

## 2021-05-18 PROCEDURE — 93298 REM INTERROG DEV EVAL SCRMS: CPT

## 2021-05-18 PROCEDURE — G2066: CPT

## 2021-05-20 PROBLEM — I48.0 PAROXYSMAL ATRIAL FIBRILLATION: Chronic | Status: ACTIVE | Noted: 2021-04-13

## 2021-05-24 ENCOUNTER — NON-APPOINTMENT (OUTPATIENT)
Age: 41
End: 2021-05-24

## 2021-05-24 ENCOUNTER — APPOINTMENT (OUTPATIENT)
Dept: ELECTROPHYSIOLOGY | Facility: CLINIC | Age: 41
End: 2021-05-24
Payer: COMMERCIAL

## 2021-05-24 VITALS
RESPIRATION RATE: 14 BRPM | DIASTOLIC BLOOD PRESSURE: 78 MMHG | OXYGEN SATURATION: 100 % | HEIGHT: 73 IN | SYSTOLIC BLOOD PRESSURE: 118 MMHG | HEART RATE: 60 BPM | WEIGHT: 186 LBS | BODY MASS INDEX: 24.65 KG/M2

## 2021-05-24 PROCEDURE — 93000 ELECTROCARDIOGRAM COMPLETE: CPT | Mod: 59

## 2021-05-24 PROCEDURE — 93285 PRGRMG DEV EVAL SCRMS IP: CPT

## 2021-05-24 PROCEDURE — 99214 OFFICE O/P EST MOD 30 MIN: CPT | Mod: 25

## 2021-05-24 PROCEDURE — 99072 ADDL SUPL MATRL&STAF TM PHE: CPT

## 2021-05-24 RX ORDER — COLCHICINE 0.6 MG/1
0.6 TABLET ORAL
Qty: 30 | Refills: 0 | Status: DISCONTINUED | COMMUNITY
Start: 2021-04-15 | End: 2021-05-24

## 2021-05-24 RX ORDER — SUCRALFATE 1 G/10ML
1 SUSPENSION ORAL
Qty: 280 | Refills: 0 | Status: DISCONTINUED | COMMUNITY
Start: 2021-04-15 | End: 2021-05-24

## 2021-05-24 RX ORDER — ASPIRIN 81 MG/1
81 TABLET, DELAYED RELEASE ORAL
Qty: 90 | Refills: 1 | Status: DISCONTINUED | COMMUNITY
Start: 2021-01-13 | End: 2021-05-24

## 2021-05-24 NOTE — HISTORY OF PRESENT ILLNESS
[FreeTextEntry1] : Mr. Wilkins is a 40 year old man with paroxysmal atrial fibrillation s/ post WACA PVI  4/14/2021. He has had no symptomatic episodes since. JAVIER WILKINS  denies chest pain, chest pressure, shortness of breath, lightheadedness, dizziness, palpitations, syncope, presyncope, orthopnea, PND, or edema. \par \par

## 2021-05-24 NOTE — ASSESSMENT
[FreeTextEntry1] : This is a 40 year old man s/p WACA PVI.  for paroxysmal atrial fibrillation.  He has completed a course of colchicine and sucralfate and is scheduled to complet 6 weeks of pantoprazole.\par \par Will discontinued propafenone and continue xarelto for two more months. \par Given [id number] score 0 will switch to ASA at that time.

## 2021-05-24 NOTE — CARDIOLOGY SUMMARY
[de-identified] : 5/24/21 : Interogation of Loop Reocrder demonstrates episodes of sinus tachycardia. No AF since ablation.

## 2021-06-22 ENCOUNTER — APPOINTMENT (OUTPATIENT)
Dept: ELECTROPHYSIOLOGY | Facility: CLINIC | Age: 41
End: 2021-06-22
Payer: COMMERCIAL

## 2021-06-22 ENCOUNTER — NON-APPOINTMENT (OUTPATIENT)
Age: 41
End: 2021-06-22

## 2021-06-22 PROCEDURE — G2066: CPT

## 2021-06-22 PROCEDURE — 93298 REM INTERROG DEV EVAL SCRMS: CPT

## 2021-07-08 ENCOUNTER — RX RENEWAL (OUTPATIENT)
Age: 41
End: 2021-07-08

## 2021-07-19 ENCOUNTER — TRANSCRIPTION ENCOUNTER (OUTPATIENT)
Age: 41
End: 2021-07-19

## 2021-07-27 ENCOUNTER — APPOINTMENT (OUTPATIENT)
Dept: ELECTROPHYSIOLOGY | Facility: CLINIC | Age: 41
End: 2021-07-27
Payer: COMMERCIAL

## 2021-07-27 ENCOUNTER — NON-APPOINTMENT (OUTPATIENT)
Age: 41
End: 2021-07-27

## 2021-07-27 PROCEDURE — G2066: CPT

## 2021-07-27 PROCEDURE — 93298 REM INTERROG DEV EVAL SCRMS: CPT

## 2021-07-29 ENCOUNTER — APPOINTMENT (OUTPATIENT)
Dept: ELECTROPHYSIOLOGY | Facility: CLINIC | Age: 41
End: 2021-07-29
Payer: COMMERCIAL

## 2021-07-29 VITALS
SYSTOLIC BLOOD PRESSURE: 144 MMHG | WEIGHT: 192 LBS | OXYGEN SATURATION: 98 % | HEART RATE: 70 BPM | HEIGHT: 73 IN | DIASTOLIC BLOOD PRESSURE: 91 MMHG | BODY MASS INDEX: 25.45 KG/M2

## 2021-07-29 PROCEDURE — 93285 PRGRMG DEV EVAL SCRMS IP: CPT

## 2021-07-29 PROCEDURE — 99213 OFFICE O/P EST LOW 20 MIN: CPT | Mod: 25

## 2021-07-29 RX ORDER — PROPAFENONE 225 MG/1
225 CAPSULE, EXTENDED RELEASE ORAL
Qty: 180 | Refills: 3 | Status: DISCONTINUED | COMMUNITY
Start: 2021-01-29 | End: 2021-07-29

## 2021-07-29 RX ORDER — PANTOPRAZOLE 40 MG/1
40 TABLET, DELAYED RELEASE ORAL
Qty: 30 | Refills: 0 | Status: DISCONTINUED | COMMUNITY
Start: 2021-04-15 | End: 2021-07-29

## 2021-08-06 NOTE — ASSESSMENT
[FreeTextEntry1] : This is a 40 year old man s/p WACA PVI.  for paroxysmal atrial fibrillation.  \par \par Implantable cardiac monitro interrogation reveals sinus to sinus tachycardia ( Binned as AT in device) \par  \par Given [id number] score 0 will switch to ASA.

## 2021-08-06 NOTE — CARDIOLOGY SUMMARY
[de-identified] : 5/24/21 : Interogation of Loop Reocrder demonstrates episodes of sinus tachycardia. No AF since ablation.

## 2021-08-06 NOTE — HISTORY OF PRESENT ILLNESS
[FreeTextEntry1] : Mr. Wilkins is a 40 year old man with paroxysmal atrial fibrillation s/ post WACA PVI  4/14/2021. He has had no symptomatic episodes since. No arrhythmia off propafenone. JAVIER WILKINS  denies chest pain, chest pressure, shortness of breath, lightheadedness, dizziness, palpitations, syncope, presyncope, orthopnea, PND, or edema. \par \par

## 2021-08-31 ENCOUNTER — APPOINTMENT (OUTPATIENT)
Dept: ELECTROPHYSIOLOGY | Facility: CLINIC | Age: 41
End: 2021-08-31
Payer: COMMERCIAL

## 2021-08-31 ENCOUNTER — NON-APPOINTMENT (OUTPATIENT)
Age: 41
End: 2021-08-31

## 2021-08-31 PROCEDURE — 93298 REM INTERROG DEV EVAL SCRMS: CPT

## 2021-08-31 PROCEDURE — G2066: CPT

## 2021-10-05 ENCOUNTER — NON-APPOINTMENT (OUTPATIENT)
Age: 41
End: 2021-10-05

## 2021-10-05 ENCOUNTER — APPOINTMENT (OUTPATIENT)
Dept: ELECTROPHYSIOLOGY | Facility: CLINIC | Age: 41
End: 2021-10-05
Payer: COMMERCIAL

## 2021-10-05 PROCEDURE — G2066: CPT

## 2021-10-05 PROCEDURE — 93298 REM INTERROG DEV EVAL SCRMS: CPT

## 2021-11-05 ENCOUNTER — TRANSCRIPTION ENCOUNTER (OUTPATIENT)
Age: 41
End: 2021-11-05

## 2021-11-05 ENCOUNTER — FORM ENCOUNTER (OUTPATIENT)
Age: 41
End: 2021-11-05

## 2021-11-06 ENCOUNTER — TRANSCRIPTION ENCOUNTER (OUTPATIENT)
Age: 41
End: 2021-11-06

## 2021-11-08 ENCOUNTER — APPOINTMENT (OUTPATIENT)
Dept: ELECTROPHYSIOLOGY | Facility: CLINIC | Age: 41
End: 2021-11-08
Payer: COMMERCIAL

## 2021-11-09 ENCOUNTER — NON-APPOINTMENT (OUTPATIENT)
Age: 41
End: 2021-11-09

## 2021-11-09 PROCEDURE — G2066: CPT | Mod: NC

## 2021-11-09 PROCEDURE — 93298 REM INTERROG DEV EVAL SCRMS: CPT

## 2021-11-10 ENCOUNTER — EMERGENCY (EMERGENCY)
Facility: HOSPITAL | Age: 41
LOS: 1 days | Discharge: HOME | End: 2021-11-10
Attending: EMERGENCY MEDICINE
Payer: COMMERCIAL

## 2021-11-10 ENCOUNTER — APPOINTMENT (OUTPATIENT)
Dept: DISASTER EMERGENCY | Facility: HOSPITAL | Age: 41
End: 2021-11-10

## 2021-11-10 VITALS
SYSTOLIC BLOOD PRESSURE: 126 MMHG | HEART RATE: 76 BPM | RESPIRATION RATE: 18 BRPM | HEIGHT: 73 IN | TEMPERATURE: 98 F | WEIGHT: 192.02 LBS | DIASTOLIC BLOOD PRESSURE: 86 MMHG | OXYGEN SATURATION: 98 %

## 2021-11-10 VITALS
OXYGEN SATURATION: 99 % | HEART RATE: 69 BPM | SYSTOLIC BLOOD PRESSURE: 127 MMHG | DIASTOLIC BLOOD PRESSURE: 86 MMHG | RESPIRATION RATE: 16 BRPM | TEMPERATURE: 98 F

## 2021-11-10 PROCEDURE — M0243: CPT

## 2021-11-10 PROCEDURE — L9998: CPT

## 2021-11-10 RX ORDER — SODIUM CHLORIDE 9 MG/ML
250 INJECTION INTRAMUSCULAR; INTRAVENOUS; SUBCUTANEOUS
Refills: 0 | Status: DISCONTINUED | OUTPATIENT
Start: 2021-11-10 | End: 2021-11-13

## 2021-11-10 RX ADMIN — SODIUM CHLORIDE 25 MILLILITER(S): 9 INJECTION INTRAMUSCULAR; INTRAVENOUS; SUBCUTANEOUS at 10:11

## 2021-11-10 NOTE — ED PROVIDER NOTE - NSFOLLOWUPINSTRUCTIONS_ED_ALL_ED_FT
Please follow up with your primary care doctor in the next 2-3 days    Continue with your medications as directed    Refer to your paperwork for further instructions and information on the Regen-COV medications that you were given.    Please return to the ER if you are having shortness of breath, chest pain, lightheadedness, vomiting, or for ANY other concerns 1. You were seen in the ED for symptoms of the novel coronavirus (COVID-19).    2. YOU MUST SELF-QUARANTINE. Avoid close contact anyone until you meet the below criteria.     3. You are eligible to return to work or usual activities if:            1. It has been at least 10 days since your symptoms started AND            2. No fevers (temperature over 100.4F) for at least 24 hours AND            3. Your cough and shortness of breath has improved     4. Return to the ED for trouble catching your breath when you are resting.    5. You may take over the counter Tylenol 1000mg every 6 hours and/or Ibuprofen 600mg every 6 hours as needed for fever or pain. Take with food. You may also take over the counter cough medicine for your cough, hot water with honey is also effective. Go outside, away from others, every few hours and take some controlled deep breathes to expand your lungs.     Please follow up with your primary care doctor in the next 2-3 days    Continue with your medications as directed    Refer to your paperwork for further instructions and information on the Regen-COV medications that you were given.    Please return to the ER if you are having shortness of breath, chest pain, lightheadedness, vomiting, or for ANY other concerns

## 2021-11-10 NOTE — ED PROVIDER NOTE - OBJECTIVE STATEMENT
41 y M COVID+ for MAB infusion 41 y M COVID+ for MAB infusion  Levar Vargas PA-C: 41y M pmhx afib s/p ablation, elev BMI, presents to ED for COVID MAB infusion referred by [facility]. COVID+ 11/5. Symptomatic 11/3 - f/c, HA, body aches. Symptoms improving no fevers since Sunday. Unvaccinated against COVID. Denies cp, sob, abd pain. Former smoker quit 15yr ago. 41 y M COVID+ for MAB infusion  Levar Vargas PA-C: 41y M pmhx afib s/p ablation, elev BMI, presents to ED for COVID MAB infusion referred by [facility] [place]. COVID+ 11/5. Symptomatic 11/3 - f/c, HA, body aches. Symptoms improving no fevers since Sunday. Unvaccinated against COVID. Denies cp, sob, abd pain. Former smoker quit 15yr ago. No hx of vaccine or infusion allergic rx.

## 2021-11-10 NOTE — ED PROVIDER NOTE - PHYSICAL EXAMINATION
GENERAL: AAOx4, GCS 15, NAD, WDWN   HEENT: MMM, no jugular venous distension, supple neck, PERRLA, EOMI, nonicteric sclera  PULM: CTA B, no crackles/rubs/rales  CV: RRR, S1S2, no MRG  ABD: Flat abdomen, NTND, no R/G/R, no CVAT.    MSK: MCCLENDON, +2 pulses x4  NEURO: No obvious focal deficits  PSYCH: AAOx3, clear thought and normal sensorium GENERAL: AAOx4, GCS 15, NAD, WDWN   HEENT: MMM, no jugular venous distension, supple neck, PERRLA, EOMI, nonicteric sclera  PULM: CTA B, no crackles/rubs/rales  CV: RRR, S1S2, no MRG  ABD: Flat abdomen, NTND, no R/G/R, no CVAT.    MSK: MCCLENDON, +2 pulses x4  NEURO: No obvious focal deficits  PSYCH: AAOx3, clear thought and normal sensorium    Levar Vargas PA-C:   GEN: Pt in NAD, non-toxic, alert.  PSYCH: Affect appropriate.  EYES: Sclera white w/o injection.   ENT: No dysphonia. Neck supple FROM. Trachea midline.   RESP: No evidence of respiratory distress, speaking in full sentences, SpO2 98 on RA. CTAB. No wheezing.  CARDIAC: RRR, clear distinct S1, S2, no appreciable murmurs.  ABD: Abdomen soft, non-tender.  VASC: 2+ radial pulses b/l. No edema or calf tenderness.  SKIN: No rashes on the trunk.

## 2021-11-10 NOTE — ED PROVIDER NOTE - PATIENT PORTAL LINK FT
You can access the FollowMyHealth Patient Portal offered by [facility] by registering at the following website: [email or web address]. By joining [employer]’s FollowMyHealth portal, you will also be able to view your health information using other applications (apps) compatible with our system.

## 2021-11-11 ENCOUNTER — TRANSCRIPTION ENCOUNTER (OUTPATIENT)
Age: 41
End: 2021-11-11

## 2021-12-16 ENCOUNTER — APPOINTMENT (OUTPATIENT)
Dept: ELECTROPHYSIOLOGY | Facility: CLINIC | Age: 41
End: 2021-12-16
Payer: COMMERCIAL

## 2021-12-17 ENCOUNTER — NON-APPOINTMENT (OUTPATIENT)
Age: 41
End: 2021-12-17

## 2021-12-17 PROCEDURE — G2066: CPT

## 2021-12-17 PROCEDURE — 93298 REM INTERROG DEV EVAL SCRMS: CPT

## 2021-12-28 ENCOUNTER — TRANSCRIPTION ENCOUNTER (OUTPATIENT)
Age: 41
End: 2021-12-28

## 2022-01-20 ENCOUNTER — APPOINTMENT (OUTPATIENT)
Dept: ELECTROPHYSIOLOGY | Facility: CLINIC | Age: 42
End: 2022-01-20
Payer: COMMERCIAL

## 2022-01-21 ENCOUNTER — NON-APPOINTMENT (OUTPATIENT)
Age: 42
End: 2022-01-21

## 2022-01-21 PROCEDURE — 93298 REM INTERROG DEV EVAL SCRMS: CPT

## 2022-01-21 PROCEDURE — G2066: CPT

## 2022-01-27 ENCOUNTER — APPOINTMENT (OUTPATIENT)
Dept: ELECTROPHYSIOLOGY | Facility: CLINIC | Age: 42
End: 2022-01-27
Payer: COMMERCIAL

## 2022-01-27 VITALS
WEIGHT: 192 LBS | SYSTOLIC BLOOD PRESSURE: 127 MMHG | HEIGHT: 73 IN | DIASTOLIC BLOOD PRESSURE: 86 MMHG | BODY MASS INDEX: 25.45 KG/M2 | HEART RATE: 87 BPM | OXYGEN SATURATION: 98 %

## 2022-01-27 PROCEDURE — 93285 PRGRMG DEV EVAL SCRMS IP: CPT

## 2022-01-27 PROCEDURE — 99214 OFFICE O/P EST MOD 30 MIN: CPT

## 2022-01-27 RX ORDER — RIVAROXABAN 20 MG/1
20 TABLET, FILM COATED ORAL
Qty: 30 | Refills: 3 | Status: DISCONTINUED | COMMUNITY
Start: 2021-03-01 | End: 2022-01-27

## 2022-02-24 ENCOUNTER — APPOINTMENT (OUTPATIENT)
Dept: ELECTROPHYSIOLOGY | Facility: CLINIC | Age: 42
End: 2022-02-24
Payer: COMMERCIAL

## 2022-02-25 ENCOUNTER — NON-APPOINTMENT (OUTPATIENT)
Age: 42
End: 2022-02-25

## 2022-02-25 PROCEDURE — 93298 REM INTERROG DEV EVAL SCRMS: CPT

## 2022-02-25 PROCEDURE — G2066: CPT

## 2022-03-21 NOTE — ASSESSMENT
[FreeTextEntry1] : This is a 40 year old man with paroxysmal atrial fibrillation s/p WACA PVI. Implantable cardiac monitor demonstrates no recurrent atrial fibrilation. \par CHADS2-VASc score 0. \par \par Will continue ASA 81 mg daily.  As well as Metoprolol 25 mg Dialy.

## 2022-03-21 NOTE — HISTORY OF PRESENT ILLNESS
[FreeTextEntry1] : Mr. Wilkins is a 41 year old man with paroxysmal atrial fibrillation s/ post WACA PVI 4/14/2021. He has had no symptomatic episodes since. No arrhythmia off propafenone. JAVIER WILKINS denies chest pain, chest pressure, shortness of breath, lightheadedness, dizziness, palpitations, syncope, presyncope, orthopnea, PND, or edema. \par \par COVID in November and second reinfection early January.

## 2022-03-31 ENCOUNTER — APPOINTMENT (OUTPATIENT)
Dept: ELECTROPHYSIOLOGY | Facility: CLINIC | Age: 42
End: 2022-03-31
Payer: COMMERCIAL

## 2022-04-01 ENCOUNTER — NON-APPOINTMENT (OUTPATIENT)
Age: 42
End: 2022-04-01

## 2022-04-01 PROCEDURE — 93298 REM INTERROG DEV EVAL SCRMS: CPT

## 2022-04-01 PROCEDURE — G2066: CPT

## 2022-05-06 ENCOUNTER — APPOINTMENT (OUTPATIENT)
Dept: ELECTROPHYSIOLOGY | Facility: CLINIC | Age: 42
End: 2022-05-06
Payer: COMMERCIAL

## 2022-05-06 ENCOUNTER — NON-APPOINTMENT (OUTPATIENT)
Age: 42
End: 2022-05-06

## 2022-05-06 PROCEDURE — G2066: CPT

## 2022-05-06 PROCEDURE — 93298 REM INTERROG DEV EVAL SCRMS: CPT

## 2022-06-03 ENCOUNTER — APPOINTMENT (OUTPATIENT)
Dept: PAIN MANAGEMENT | Facility: CLINIC | Age: 42
End: 2022-06-03
Payer: COMMERCIAL

## 2022-06-03 VITALS — BODY MASS INDEX: 25.45 KG/M2 | WEIGHT: 192 LBS | HEIGHT: 73 IN

## 2022-06-03 DIAGNOSIS — M54.50 LOW BACK PAIN, UNSPECIFIED: ICD-10-CM

## 2022-06-03 DIAGNOSIS — M79.10 MYALGIA, UNSPECIFIED SITE: ICD-10-CM

## 2022-06-03 PROCEDURE — 99204 OFFICE O/P NEW MOD 45 MIN: CPT | Mod: 25

## 2022-06-03 PROCEDURE — J3490M: CUSTOM

## 2022-06-03 PROCEDURE — 20552 NJX 1/MLT TRIGGER POINT 1/2: CPT

## 2022-06-03 NOTE — PHYSICAL EXAM
[de-identified] : \par PHYSICAL EXAM\par \par Constitutional: \par Appears well, no apparent distress\par Ability to communicate: Normal\par Respiratory: non-labored breathing\par Skin: no rash noted\par Head: normocephalic, atraumatic\par Neck: no visible thyroid enlargement\par Eyes: extraocular movements intact\par Neurologic: alert and oriented x3\par Psychiatric: normal mood, affect, and behavior\par \par Lumbar Spine: \par Palpation: left lumbar paraspinal spasm and left lumbar paraspinal tenderness to palpation.\par ROM: Diminished range of motion in all plains.  Patient notes pain with lateral bending to the left.\par MMT: Motor exam is 5/5 through out bilateral lower extremities.\par Sensation: Light touch and pain is intact throughout bilateral lower extremities.\par Reflexes: achilles and patella reflexes are intact and  symmetrical.  No sustained clonus.\par Special Testing: Positive kemps maneuver on the left side\par \par

## 2022-06-03 NOTE — HISTORY OF PRESENT ILLNESS
[7] : 7 [Stabbing] : stabbing [Constant] : constant [Full time] : Work status: full time [de-identified] : pt states he is having pain in the lower back on his left side he feels pain in the side of his left side he describes the pain as if he breathes in it hurts him  [] : no [FreeTextEntry9] : motrin  [de-identified] : truning, movement

## 2022-06-10 ENCOUNTER — APPOINTMENT (OUTPATIENT)
Dept: ELECTROPHYSIOLOGY | Facility: CLINIC | Age: 42
End: 2022-06-10
Payer: COMMERCIAL

## 2022-06-10 ENCOUNTER — NON-APPOINTMENT (OUTPATIENT)
Age: 42
End: 2022-06-10

## 2022-06-10 PROCEDURE — G2066: CPT

## 2022-06-10 PROCEDURE — 93298 REM INTERROG DEV EVAL SCRMS: CPT

## 2022-07-15 ENCOUNTER — APPOINTMENT (OUTPATIENT)
Dept: ELECTROPHYSIOLOGY | Facility: CLINIC | Age: 42
End: 2022-07-15

## 2022-07-15 ENCOUNTER — NON-APPOINTMENT (OUTPATIENT)
Age: 42
End: 2022-07-15

## 2022-07-15 PROCEDURE — G2066: CPT

## 2022-07-15 PROCEDURE — 93298 REM INTERROG DEV EVAL SCRMS: CPT

## 2022-08-18 ENCOUNTER — APPOINTMENT (OUTPATIENT)
Dept: ELECTROPHYSIOLOGY | Facility: CLINIC | Age: 42
End: 2022-08-18

## 2022-08-19 ENCOUNTER — NON-APPOINTMENT (OUTPATIENT)
Age: 42
End: 2022-08-19

## 2022-08-19 ENCOUNTER — APPOINTMENT (OUTPATIENT)
Dept: INTERNAL MEDICINE | Facility: CLINIC | Age: 42
End: 2022-08-19

## 2022-08-19 VITALS
HEIGHT: 73 IN | BODY MASS INDEX: 25.84 KG/M2 | HEART RATE: 63 BPM | TEMPERATURE: 97.7 F | OXYGEN SATURATION: 98 % | DIASTOLIC BLOOD PRESSURE: 73 MMHG | WEIGHT: 195 LBS | SYSTOLIC BLOOD PRESSURE: 141 MMHG

## 2022-08-19 VITALS — SYSTOLIC BLOOD PRESSURE: 140 MMHG | DIASTOLIC BLOOD PRESSURE: 90 MMHG

## 2022-08-19 VITALS — SYSTOLIC BLOOD PRESSURE: 120 MMHG | DIASTOLIC BLOOD PRESSURE: 90 MMHG

## 2022-08-19 DIAGNOSIS — G43.009 MIGRAINE W/OUT AURA, NOT INTRACTABLE, W/OUT STATUS MIGRAINOSUS: ICD-10-CM

## 2022-08-19 DIAGNOSIS — R07.89 OTHER CHEST PAIN: ICD-10-CM

## 2022-08-19 LAB
ALBUMIN SERPL ELPH-MCNC: 4.6 G/DL
ALP BLD-CCNC: 78 U/L
ALT SERPL-CCNC: 47 U/L
ANION GAP SERPL CALC-SCNC: 11 MMOL/L
AST SERPL-CCNC: 22 U/L
BASOPHILS # BLD AUTO: 0.03 K/UL
BASOPHILS NFR BLD AUTO: 0.6 %
BILIRUB SERPL-MCNC: 0.5 MG/DL
BILIRUB UR QL STRIP: NORMAL
BUN SERPL-MCNC: 24 MG/DL
CALCIUM SERPL-MCNC: 9.5 MG/DL
CHLORIDE SERPL-SCNC: 104 MMOL/L
CHOLEST SERPL-MCNC: 216 MG/DL
CLARITY UR: CLEAR
CO2 SERPL-SCNC: 24 MMOL/L
COLLECTION METHOD: NORMAL
CREAT SERPL-MCNC: 1.2 MG/DL
EGFR: 76 ML/MIN/1.73M2
EOSINOPHIL # BLD AUTO: 0.11 K/UL
EOSINOPHIL NFR BLD AUTO: 2.2 %
GLUCOSE SERPL-MCNC: 94 MG/DL
GLUCOSE UR-MCNC: NORMAL
HCG UR QL: 0.2 EU/DL
HCT VFR BLD CALC: 45.6 %
HDLC SERPL-MCNC: 51 MG/DL
HGB BLD-MCNC: 16 G/DL
HGB UR QL STRIP.AUTO: NORMAL
IMM GRANULOCYTES NFR BLD AUTO: 0.4 %
KETONES UR-MCNC: NORMAL
LDLC SERPL CALC-MCNC: 143 MG/DL
LEUKOCYTE ESTERASE UR QL STRIP: NORMAL
LYMPHOCYTES # BLD AUTO: 2 K/UL
LYMPHOCYTES NFR BLD AUTO: 39.4 %
MAN DIFF?: NORMAL
MCHC RBC-ENTMCNC: 28.4 PG
MCHC RBC-ENTMCNC: 35.1 GM/DL
MCV RBC AUTO: 80.9 FL
MONOCYTES # BLD AUTO: 0.57 K/UL
MONOCYTES NFR BLD AUTO: 11.2 %
NEUTROPHILS # BLD AUTO: 2.35 K/UL
NEUTROPHILS NFR BLD AUTO: 46.2 %
NITRITE UR QL STRIP: NORMAL
NONHDLC SERPL-MCNC: 165 MG/DL
PH UR STRIP: 0.2
PLATELET # BLD AUTO: 208 K/UL
POTASSIUM SERPL-SCNC: 4.4 MMOL/L
PROT SERPL-MCNC: 7.1 G/DL
PROT UR STRIP-MCNC: NORMAL
PSA SERPL-MCNC: 0.72 NG/ML
RBC # BLD: 5.64 M/UL
RBC # FLD: 13.2 %
SODIUM SERPL-SCNC: 139 MMOL/L
SP GR UR STRIP: 1.02
TRIGL SERPL-MCNC: 111 MG/DL
WBC # FLD AUTO: 5.08 K/UL

## 2022-08-19 PROCEDURE — G2066: CPT

## 2022-08-19 PROCEDURE — 93298 REM INTERROG DEV EVAL SCRMS: CPT

## 2022-08-19 PROCEDURE — 99396 PREV VISIT EST AGE 40-64: CPT | Mod: 25

## 2022-08-19 PROCEDURE — 36415 COLL VENOUS BLD VENIPUNCTURE: CPT

## 2022-08-19 PROCEDURE — 81003 URINALYSIS AUTO W/O SCOPE: CPT | Mod: QW

## 2022-08-19 NOTE — HISTORY OF PRESENT ILLNESS
[FreeTextEntry1] : sees cardiologist re paf last monitor  check no recurrent a fib off asa and b blocker. hjas been having  more migraines  had  covid and does not want vaccines.  he had strong fh colon cancer and wants to do  colonoscopy

## 2022-08-19 NOTE — HEALTH RISK ASSESSMENT
[Former] : Former [No] : No [Yes] : In the past 12 months have you used drugs other than those required for medical reasons? Yes [0] : 2) Feeling down, depressed, or hopeless: Not at all (0) [PHQ-2 Negative - No further assessment needed] : PHQ-2 Negative - No further assessment needed [de-identified] : marijuana [de-identified] : needs more exercise [de-identified] : good [[id number]] : 0 [None] : None [] :  [Fully functional (bathing, dressing, toileting, transferring, walking, feeding)] : Fully functional (bathing, dressing, toileting, transferring, walking, feeding) [Fully functional (using the telephone, shopping, preparing meals, housekeeping, doing laundry, using] : Fully functional and needs no help or supervision to perform IADLs (using the telephone, shopping, preparing meals, housekeeping, doing laundry, using transportation, managing medications and managing finances) [Reports changes in hearing] : Reports no changes in hearing [Reports changes in vision] : Reports no changes in vision [Reports changes in dental health] : Reports no changes in dental health

## 2022-08-21 LAB
APPEARANCE: CLEAR
BACTERIA: NEGATIVE
BILIRUBIN URINE: NEGATIVE
BLOOD URINE: NORMAL
COLOR: YELLOW
GLUCOSE QUALITATIVE U: NEGATIVE
HYALINE CASTS: 0 /LPF
KETONES URINE: NEGATIVE
LEUKOCYTE ESTERASE URINE: NEGATIVE
MICROSCOPIC-UA: NORMAL
NITRITE URINE: NEGATIVE
PH URINE: 7
PROTEIN URINE: NORMAL
RED BLOOD CELLS URINE: 3 /HPF
SPECIFIC GRAVITY URINE: 1.03
SQUAMOUS EPITHELIAL CELLS: 0 /HPF
UROBILINOGEN URINE: NORMAL
WHITE BLOOD CELLS URINE: 1 /HPF

## 2022-08-31 DIAGNOSIS — Z12.11 ENCOUNTER FOR SCREENING FOR MALIGNANT NEOPLASM OF COLON: ICD-10-CM

## 2022-09-02 ENCOUNTER — APPOINTMENT (OUTPATIENT)
Dept: ELECTROPHYSIOLOGY | Facility: CLINIC | Age: 42
End: 2022-09-02

## 2022-09-02 VITALS
HEIGHT: 73 IN | OXYGEN SATURATION: 100 % | WEIGHT: 195 LBS | BODY MASS INDEX: 25.84 KG/M2 | HEART RATE: 64 BPM | SYSTOLIC BLOOD PRESSURE: 129 MMHG | DIASTOLIC BLOOD PRESSURE: 99 MMHG

## 2022-09-02 VITALS — DIASTOLIC BLOOD PRESSURE: 102 MMHG | SYSTOLIC BLOOD PRESSURE: 133 MMHG

## 2022-09-02 DIAGNOSIS — Z45.09 ENCOUNTER FOR ADJUSTMENT AND MANAGEMENT OF OTHER CARDIAC DEVICE: ICD-10-CM

## 2022-09-02 PROCEDURE — 93285 PRGRMG DEV EVAL SCRMS IP: CPT

## 2022-09-02 PROCEDURE — 99211 OFF/OP EST MAY X REQ PHY/QHP: CPT

## 2022-09-02 NOTE — DISCUSSION/SUMMARY
[FreeTextEntry1] : return to office in 6months ILR check\par Follow up with PCP for elevated BP readings.

## 2022-09-02 NOTE — ASSESSMENT
[FreeTextEntry1] : This is a 40 year old man with paroxysmal atrial fibrillation s/p WACA PVI. Implantable cardiac monitor demonstrates no recurrent atrial fibrilation. \par CHADS2-VASc score 0, his off  anticoagulation and no longer on antiarrhythmics. \par \par ILR interrogation today shows insu rhythm, without any arrhythmia recurrence.  \par He would like to discontinue monitoring due to high monthly cost ($800/month). Discussed with pt that can stop monthly remotes and continue biannual office checks and patient in agreement. \par \par

## 2022-09-02 NOTE — HISTORY OF PRESENT ILLNESS
[None] : The patient complains of no symptoms [FreeTextEntry1] : Mr. Selby is a 41 year old man with paroxysmal atrial fibrillation s/ post WACA PVI 4/14/2021. He has had no symptomatic episodes since. He has been off antiarrhythmics for over 6months without any arrhythmia recurrence.

## 2022-09-22 ENCOUNTER — APPOINTMENT (OUTPATIENT)
Dept: ELECTROPHYSIOLOGY | Facility: CLINIC | Age: 42
End: 2022-09-22

## 2022-09-30 ENCOUNTER — APPOINTMENT (OUTPATIENT)
Dept: INTERNAL MEDICINE | Facility: CLINIC | Age: 42
End: 2022-09-30

## 2022-09-30 VITALS
HEART RATE: 64 BPM | TEMPERATURE: 97.8 F | BODY MASS INDEX: 25.45 KG/M2 | SYSTOLIC BLOOD PRESSURE: 136 MMHG | WEIGHT: 192 LBS | HEIGHT: 73 IN | OXYGEN SATURATION: 99 % | DIASTOLIC BLOOD PRESSURE: 86 MMHG

## 2022-09-30 VITALS — RESPIRATION RATE: 11 BRPM | SYSTOLIC BLOOD PRESSURE: 128 MMHG | DIASTOLIC BLOOD PRESSURE: 90 MMHG

## 2022-09-30 PROCEDURE — 99213 OFFICE O/P EST LOW 20 MIN: CPT

## 2022-09-30 RX ORDER — SODIUM PICOSULFATE, MAGNESIUM OXIDE, AND ANHYDROUS CITRIC ACID 10; 3.5; 12 MG/160ML; G/160ML; G/160ML
10-3.5-12 MG-GM LIQUID ORAL
Qty: 1 | Refills: 0 | Status: DISCONTINUED | COMMUNITY
Start: 2022-08-31 | End: 2022-09-30

## 2022-09-30 NOTE — REVIEW OF SYSTEMS
[Headache] : no headache [Dizziness] : no dizziness [Negative] : Psychiatric [FreeTextEntry5] : as  above

## 2022-09-30 NOTE — HISTORY OF PRESENT ILLNESS
[FreeTextEntry1] : for  f/u of bp  which has been  high.  he has not had any documented a fib and is off the b blocker.  he says his salt intake is high and he does not exercise.

## 2022-11-05 ENCOUNTER — APPOINTMENT (OUTPATIENT)
Dept: ORTHOPEDIC SURGERY | Facility: CLINIC | Age: 42
End: 2022-11-05

## 2022-11-05 VITALS — BODY MASS INDEX: 25.18 KG/M2 | HEIGHT: 73 IN | WEIGHT: 190 LBS

## 2022-11-05 DIAGNOSIS — S83.104A UNSPECIFIED DISLOCATION OF RIGHT KNEE, INITIAL ENCOUNTER: ICD-10-CM

## 2022-11-05 PROCEDURE — 73564 X-RAY EXAM KNEE 4 OR MORE: CPT | Mod: RT

## 2022-11-05 PROCEDURE — 99204 OFFICE O/P NEW MOD 45 MIN: CPT

## 2022-11-05 NOTE — HISTORY OF PRESENT ILLNESS
[7] : 7 [6] : 6 [Dull/Aching] : dull/aching [Sharp] : sharp [de-identified] : 41 y/o M with R knee pain x 2 days after playing tennis. denies buckling or locking. denies numbness/tingling. He has not tried any interventions. no pain with stairs. Wearing compression sleeve.\par  [] : no [FreeTextEntry1] : r knee [FreeTextEntry3] : few days ago [FreeTextEntry5] : developed pain after playing sports. has sharp pain behind knee

## 2022-11-05 NOTE — IMAGING
[de-identified] : PE R knee: no swelling, no JLT, neg katherine, ligs stable, +tenderness to posterior knee, FROM, good ankle motion, NVI\par  [Right] : right knee [There are no fractures, subluxations or dislocations. No significant abnormalities are seen] : There are no fractures, subluxations or dislocations. No significant abnormalities are seen

## 2022-11-05 NOTE — ASSESSMENT
[FreeTextEntry1] : A/P R gastroc tendonitis, r/o tear\par - MRI\par - WBAT\par - NSAIDS\par - ice/elevation\par - f/u sports medicine after MRI\par

## 2022-11-08 ENCOUNTER — APPOINTMENT (OUTPATIENT)
Dept: MRI IMAGING | Facility: CLINIC | Age: 42
End: 2022-11-08

## 2022-12-07 ENCOUNTER — NON-APPOINTMENT (OUTPATIENT)
Age: 42
End: 2022-12-07

## 2022-12-09 ENCOUNTER — APPOINTMENT (OUTPATIENT)
Dept: INTERNAL MEDICINE | Facility: AMBULATORY MEDICAL SERVICES | Age: 42
End: 2022-12-09
Payer: COMMERCIAL

## 2022-12-09 PROCEDURE — 45378 DIAGNOSTIC COLONOSCOPY: CPT

## 2022-12-22 NOTE — REVIEW OF SYSTEMS
History & Physical       Patient: Marco Antonio Pandya  YOB: 1984    MRN: [id number]     Acct: [de-identified]    PCP: No primary care provider on file. Date of Admission: 12/22/2022    Date of Service: Patient seen / examined on 12/22/22 and admitted to observation with expected LOS less than two midnights due to medical therapy. ASSESSMENT / PLAN:    Intractable emesis -secondary to opioid withdrawal.  Multiple medications tried without much success in the ED. Urine noted to have ketones. Patient received 1 L of IV fluid. Plan -initiate Phenergan every 4 hours for nausea. Continue maintenance IV fluids at 100 cc/h    Opioid withdrawal -history of opioid use, recent relapse 10/2022 now with symptoms of opioid withdrawal including abdominal cramping, diarrhea, nausea, emesis. Urine positive for amphetamines, cannabinoids, oxycodone, fentanyl. Patient interested to get help for his underlying opioid addiction. He will likely need to be seen by Suboxone clinic as an outpatient. Plan -consult psychiatry (Dr. Js Diane notified), appreciate recommendations. Will order Ativan as needed for anxiety. Addiction services notified. 3 cm heterogeneously enhancing mass at the inferior pole of the left kidney -as seen on CT abdomen / pelvis. Concern for malignancy. Patient will need to follow-up as an outpatient. Chief Complaint:  Intractable nausea / vomiting     History of Present Illness:  34 y.o. male who presented to [facility] for evaluation of nausea/vomiting. PMHx opioid use. History provided by wife at bedside and chart review. Wife states that patient has been \"clean\" and not using any drugs for the last 5 years but recently relapsed after the death of his stepfather.   Patient confessed this a.m. that he has been using drugs since 10/2022 and around 1 AM he started having abdominal cramps, diarrhea, nausea, vomiting and reports that he has been going through withdrawal.  In the ER patient tested positive for amphetamines, cannabinoid, oxycodone, fentanyl. Urine reveals ketones. Patient was treated with 1 L IV fluid, GI cocktail, Bentyl, Vistaril, Toradol, Ativan, Zofran. Patient continued to have symptoms of intractable emesis. Hospitalist service was contacted for admission. Patient admitted under observation. Past Medical History:    No past medical history on file. Past Surgical History:    No past surgical history on file. Medications Prior to Admission:   No current facility-administered medications on file prior to encounter. No current outpatient medications on file prior to encounter. Allergies:   Patient has no known allergies. Social History:   Social History     Socioeconomic History    Marital status:      Spouse name: Not on file    Number of children: Not on file    Years of education: Not on file    Highest education level: Not on file   Occupational History    Not on file   Tobacco Use    Smoking status: Not on file    Smokeless tobacco: Not on file   Substance and Sexual Activity    Alcohol use: Not on file    Drug use: Yes     Types: Other-see comments     Comment: heroin    Sexual activity: Not on file   Other Topics Concern    Not on file   Social History Narrative    Not on file     Social Determinants of Health     Financial Resource Strain: Not on file   Food Insecurity: Not on file   Transportation Needs: Not on file   Physical Activity: Not on file   Stress: Not on file   Social Connections: Not on file   Intimate Partner Violence: Not on file   Housing Stability: Not on file     Family History:    No family history on file.   REVIEW OF SYSTEMS:    Unable to assess    PHYSICAL EXAM:  Vitals:    12/22/22 1035 12/22/22 1234 12/22/22 1458 12/22/22 1605   BP: (!) 190/106 (!) 184/96 (!) 167/115 (!) 122/98   Pulse: 59 84 66    Resp: 20 20 16    Temp: 97.6 °F (36.4 °C)      TempSrc: Oral      SpO2: 100% 99% 98% Weight:       Height:         General appearance: Acutely ill-appearing male  HEENT:  Normocephalic / atraumatic. PERRL. EOM intact. Conjunctivae appear normal.  Neck: Supple. No JVD. Respiratory: Normal respiratory effort on RA. Cardiovascular: Tachycardic  Abdomen: Soft / non-tender / non-distended. BS present. Musculoskeletal: No cyanosis or edema. Skin: Warm / dry. Normal turgor.   Psychiatric: Unable to assess    Labs:   Results for orders placed or performed during the hospital encounter of 12/22/22   COVID-19 & Influenza Combo    Specimen: Nasopharyngeal Swab   Result Value Ref Range    SARS-CoV-2 RNA, RT PCR NOT DETECTED NOT DETECTED    INFLUENZA A NOT DETECTED NOT DETECTED    INFLUENZA B NOT DETECTED NOT DETECTED   CBC with Auto Differential   Result Value Ref Range    WBC 14.0 (H) 4.8 - 10.8 thou/mm3    RBC 5.45 4.70 - 6.10 mill/mm3    Hemoglobin 16.4 14.0 - 18.0 gm/dl    Hematocrit 45.9 42.0 - 52.0 %    MCV 84.2 80.0 - 94.0 fL    MCH 30.1 26.0 - 33.0 pg    MCHC 35.7 (H) 32.2 - 35.5 gm/dl    RDW-CV 12.0 11.5 - 14.5 %    RDW-SD 36.3 35.0 - 45.0 fL    Platelets 621 (H) 507 - 400 thou/mm3    MPV 9.7 9.4 - 12.4 fL    Seg Neutrophils 78.9 %    Lymphocytes 16.3 %    Monocytes 3.6 %    Eosinophils 0.2 %    Basophils 0.5 %    Immature Granulocytes 0.5 %    Segs Absolute 11.0 (H) 1.8 - 7.7 thou/mm3    Lymphocytes Absolute 2.3 1.0 - 4.8 thou/mm3    Monocytes Absolute 0.5 0.4 - 1.3 thou/mm3    Eosinophils Absolute 0.0 0.0 - 0.4 thou/mm3    Basophils Absolute 0.1 0.0 - 0.1 thou/mm3    Immature Grans (Abs) 0.07 0.00 - 0.07 thou/mm3    nRBC 0 /100 wbc   Comprehensive Metabolic Panel   Result Value Ref Range    Glucose 153 (H) 70 - 108 mg/dL    Creatinine 0.6 0.4 - 1.2 mg/dL    BUN 16 7 - 22 mg/dL    Sodium 140 135 - 145 meq/L    Potassium 3.7 3.5 - 5.2 meq/L    Chloride 102 98 - 111 meq/L    CO2 22 (L) 23 - 33 meq/L    Calcium 9.8 8.5 - 10.5 mg/dL    AST 17 5 - 40 U/L    Alkaline Phosphatase 98 38 - 126 U/L Total Protein 7.7 6.1 - 8.0 g/dL    Albumin 4.5 3.5 - 5.1 g/dL    Total Bilirubin 1.0 0.3 - 1.2 mg/dL    ALT 20 11 - 66 U/L   Magnesium   Result Value Ref Range    Magnesium 2.2 1.6 - 2.4 mg/dL   Lipase   Result Value Ref Range    Lipase 10.5 5.6 - 51.3 U/L   Lactic Acid   Result Value Ref Range    Lactic Acid 1.3 0.5 - 2.0 mmol/L   C-Reactive Protein   Result Value Ref Range    CRP < 0.30 0.00 - 1.00 mg/dl   Urine Drug Screen   Result Value Ref Range    Amphetamine+Methamphetamine Urine Screen POSITIVE NEGATIVE    Barbiturate Quant, Ur Negative NEGATIVE    Benzodiazepine Quant, Ur Negative NEGATIVE    Cannabinoid Quant, Ur POSITIVE NEGATIVE    Cocaine Metab Quant, Ur Negative NEGATIVE    Opiates, Urine Negative NEGATIVE    Oxycodone POSITIVE NEGATIVE    PCP Quant, Ur Negative NEGATIVE    Fentanyl POSITIVE NEGATIVE   Ethanol   Result Value Ref Range    ETHYL ALCOHOL, SERUM < 0.01 0.00 %   Troponin   Result Value Ref Range    Troponin T < 0.010 ng/ml   Anion Gap   Result Value Ref Range    Anion Gap 16.0 8.0 - 16.0 meq/L   Osmolality   Result Value Ref Range    Osmolality Calc 283.6 275.0 - 300.0 mOsmol/kg   Glomerular Filtration Rate, Estimated   Result Value Ref Range    Est, Glom Filt Rate >60 >60 ml/min/1.73m2   Urine with Reflexed Micro   Result Value Ref Range    Glucose, Ur NEGATIVE NEGATIVE mg/dl    Bilirubin Urine NEGATIVE NEGATIVE    Ketones, Urine 80 (A) NEGATIVE    Specific Gravity, Urine 1.027 1.002 - 1.030    Blood, Urine NEGATIVE NEGATIVE    pH, UA 8.5 5.0 - 9.0    Protein, UA 30 (A) NEGATIVE    Urobilinogen, Urine 1.0 0.0 - 1.0 eu/dl    Nitrite, Urine NEGATIVE NEGATIVE    Leukocyte Esterase, Urine NEGATIVE NEGATIVE    Color, UA YELLOW STRAW-YELLOW    Character, Urine CLEAR CLEAR-SL CLOUD    RBC, UA 0-2 0-2/hpf /hpf    WBC, UA 0-2 0-4/hpf /hpf    Epithelial Cells, UA 0-2 3-5/hpf /hpf    Bacteria, UA NONE SEEN FEW/NONE SEEN /hpf    Casts UA NONE SEEN NONE SEEN /lpf    Crystals, UA NONE SEEN NONE SEEN Renal Epithelial, UA NONE SEEN NONE SEEN    Yeast, UA NONE SEEN NONE SEEN    CASTS 2 NONE SEEN NONE SEEN /lpf    MISCELLANEOUS 2 NONE SEEN    EKG Emergency   Result Value Ref Range    Ventricular Rate 59 BPM    Atrial Rate 59 BPM    P-R Interval 144 ms    QRS Duration 100 ms    Q-T Interval 418 ms    QTc Calculation (Bazett) 413 ms    P Axis -14 degrees    R Axis 54 degrees    T Axis 50 degrees       EKG / Radiology:     EKG:  Reviewed by me --    CXR:   Reviewed by me --    CT ABDOMEN PELVIS W IV CONTRAST Additional Contrast? None    Result Date: 12/22/2022  PROCEDURE: CT ABDOMEN PELVIS W IV CONTRAST CLINICAL INFORMATION: abdominal pain . Generalized abdominal pain starting today. COMPARISON: None. TECHNIQUE: Axial 5 mm CT images were obtained through the abdomen and pelvis after the administration of 80 cc Isovue-370 injected in the right forearm with sagittal and coronal reconstructions. All CT scans at this facility use dose modulation, iterative reconstruction, and/or weight-based dosing when appropriate to reduce radiation dose to as low as reasonably achievable. FINDINGS:  There are calcified nodules in the right lower lobe as evidence for old granulomatous disease. Visualized portions of lungs are otherwise clear. The visualized portion of the heart is unremarkable. The liver is mildly enlarged. No focal lesion is identified. The gallbladder is unremarkable. Adrenal glands are normal in appearance. There is a small exophytic cyst at the interpolar region of the left kidney. There is a 3 x 3.1 cm heterogeneously enhancing mass at the inferior pole of left kidney. The spleen and pancreas are unremarkable. No retroperitoneal or mesenteric lymphadenopathy is identified. There is mild to moderately prominent stool within the large bowel. The appendix is normal in appearance. Small bowel appears within normal limits. The bladder is unremarkable. Prostate is within normal limits. No free fluid is identified. Visualized osseous structures appear intact. No suspicious osseous lesion is identified. 3 cm heterogeneously enhancing mass at the inferior pole of the left kidney is concerning for malignancy. No definite lymphadenopathy identified. **This report has been created using voice recognition software. It may contain minor errors which are inherent in voice recognition technology. ** Final report electronically signed by Dr. Yung Aguilar MD on 12/22/2022 2:17 PM    FEN/GI/DVT:  IVF: NS @ 100 cc/hr  Electrolytes: Monitor and replace per protocols  Diet: General  GI PPX: Yes  DVT Prophylaxis: Lovenox    CODE STATUS:  Full    Thank you No primary care provider on file. for the opportunity to be involved in this patient's care.     Electronically signed by Toy Boyd MD PGY-3 IM on 12/22/2022 at 5:19 PM [Negative] : Heme/Lymph

## 2023-02-17 ENCOUNTER — APPOINTMENT (OUTPATIENT)
Dept: INTERNAL MEDICINE | Facility: CLINIC | Age: 43
End: 2023-02-17
Payer: COMMERCIAL

## 2023-02-17 VITALS — DIASTOLIC BLOOD PRESSURE: 80 MMHG | SYSTOLIC BLOOD PRESSURE: 110 MMHG | HEART RATE: 75 BPM

## 2023-02-17 VITALS
SYSTOLIC BLOOD PRESSURE: 138 MMHG | OXYGEN SATURATION: 98 % | BODY MASS INDEX: 25.45 KG/M2 | HEIGHT: 73 IN | HEART RATE: 79 BPM | WEIGHT: 192 LBS | DIASTOLIC BLOOD PRESSURE: 88 MMHG | TEMPERATURE: 97.8 F

## 2023-02-17 PROCEDURE — 99213 OFFICE O/P EST LOW 20 MIN: CPT

## 2023-02-17 NOTE — HISTORY OF PRESENT ILLNESS
[FreeTextEntry1] : follow up  prehtn.  he is exercising more and watches his salt.  has  PAF  s/p ablation no recent palps.  no cardiopulm sx

## 2023-02-17 NOTE — PHYSICAL EXAM
[Normal Sclera/Conjunctiva] : normal sclera/conjunctiva [Normal Outer Ear/Nose] : the outer ears and nose were normal in appearance [No JVD] : no jugular venous distention [No Respiratory Distress] : no respiratory distress  [No Accessory Muscle Use] : no accessory muscle use [Clear to Auscultation] : lungs were clear to auscultation bilaterally [Normal Rate] : normal rate  [Regular Rhythm] : with a regular rhythm [Normal S1, S2] : normal S1 and S2 [Normal Gait] : normal gait [Normal] : affect was normal and insight and judgment were intact

## 2023-03-23 ENCOUNTER — APPOINTMENT (OUTPATIENT)
Dept: ELECTROPHYSIOLOGY | Facility: CLINIC | Age: 43
End: 2023-03-23
Payer: COMMERCIAL

## 2023-03-23 VITALS
RESPIRATION RATE: 14 BRPM | BODY MASS INDEX: 25.45 KG/M2 | OXYGEN SATURATION: 99 % | WEIGHT: 192 LBS | SYSTOLIC BLOOD PRESSURE: 133 MMHG | HEIGHT: 73 IN | DIASTOLIC BLOOD PRESSURE: 90 MMHG

## 2023-03-23 DIAGNOSIS — R03.0 ELEVATED BLOOD-PRESSURE READING, W/OUT DIAGNOSIS OF HYPERTENSION: ICD-10-CM

## 2023-03-23 PROCEDURE — 99214 OFFICE O/P EST MOD 30 MIN: CPT

## 2023-03-23 PROCEDURE — 93285 PRGRMG DEV EVAL SCRMS IP: CPT

## 2023-03-23 NOTE — ASSESSMENT
[FreeTextEntry1] : This is a 42 year old man with paroxysmal atrial fibrillation s/p WACA PVI. Implantable cardiac monitor demonstrates no recurrent atrial fibrilation. \par CHADS2-VASc score 0, his off  anticoagulation and no longer on antiarrhythmics. \par \par ILR interrogation today shows sinus rhythm, without any arrhythmia recurrence.  \par \par \par 1) Atrial fibrillation : will continue monitoring ILR in office twice yearly. \par 2) Hypertension: BP elevated today. He reports not being compliant with exercise and diet. Discussed the option of initiating medications. He will watch diet. He is scheduling a annual physical with his primary. \par 3) Hypertlipidemia: Will also watch diet. If remains above goal would consider low dose statin. \par \par Nots from Elizabeth Garay

## 2023-03-23 NOTE — HISTORY OF PRESENT ILLNESS
[FreeTextEntry1] : Mr. Selby is a 42 year old man with paroxysmal atrial fibrillation s/ post WACA PVI 4/14/2021. He has had no symptomatic episodes since. He has been off antiarrhythmics   without any arrhythmia recurrence.  [None] : The patient complains of no symptoms

## 2023-03-23 NOTE — CARDIOLOGY SUMMARY
[de-identified] : 12/24/20 : \par \par Exercise Data: \par Exercise Protocol: Rex \par Peak heart rate: 163 \par Peak blood pressure: 166/90 \par Reason for terminating: fatigue \par Symptoms: no chest pain \par Arrhythmias: yes Frequent PAC's, rare PVC's during recovery. Rare PAC's during exercise. \par Patient achieved 92 percent of the maximum predicted heart rate \par \par \par 1. Excellent exercise tolerance. \par 2. Normal ECG response to treadmill exercise. \par  [de-identified] : 2/25/2019 : TTE \par Normal LV size and function EF 65-70%  trace MR, mild TR

## 2023-09-29 ENCOUNTER — APPOINTMENT (OUTPATIENT)
Dept: ELECTROPHYSIOLOGY | Facility: CLINIC | Age: 43
End: 2023-09-29

## 2023-11-08 NOTE — ASU PATIENT PROFILE, ADULT - BLOOD TRANSFUSION, PREVIOUS, PROFILE
No Changes or discrepancies to medication, sig, qty, or pharmacy.    Pharmacy set up and verified.    [id number] - [place], [place] - [address]    PT IS REQUESTING 90 DAY SUPPLY   no

## 2024-01-25 ENCOUNTER — NON-APPOINTMENT (OUTPATIENT)
Age: 44
End: 2024-01-25

## 2024-01-29 ENCOUNTER — APPOINTMENT (OUTPATIENT)
Dept: INTERNAL MEDICINE | Facility: CLINIC | Age: 44
End: 2024-01-29
Payer: COMMERCIAL

## 2024-01-29 ENCOUNTER — NON-APPOINTMENT (OUTPATIENT)
Age: 44
End: 2024-01-29

## 2024-01-29 ENCOUNTER — LABORATORY RESULT (OUTPATIENT)
Age: 44
End: 2024-01-29

## 2024-01-29 VITALS
OXYGEN SATURATION: 98 % | BODY MASS INDEX: 27.04 KG/M2 | SYSTOLIC BLOOD PRESSURE: 130 MMHG | HEART RATE: 69 BPM | DIASTOLIC BLOOD PRESSURE: 80 MMHG | WEIGHT: 204 LBS | HEIGHT: 73 IN

## 2024-01-29 DIAGNOSIS — R94.31 ABNORMAL ELECTROCARDIOGRAM [ECG] [EKG]: ICD-10-CM

## 2024-01-29 DIAGNOSIS — Z11.59 ENCOUNTER FOR SCREENING FOR OTHER VIRAL DISEASES: ICD-10-CM

## 2024-01-29 DIAGNOSIS — Z12.5 ENCOUNTER FOR SCREENING FOR MALIGNANT NEOPLASM OF PROSTATE: ICD-10-CM

## 2024-01-29 DIAGNOSIS — Z13.29 ENCOUNTER FOR SCREENING FOR OTHER SUSPECTED ENDOCRINE DISORDER: ICD-10-CM

## 2024-01-29 DIAGNOSIS — E55.9 VITAMIN D DEFICIENCY, UNSPECIFIED: ICD-10-CM

## 2024-01-29 DIAGNOSIS — Z11.3 ENCOUNTER FOR SCREENING FOR INFECTIONS WITH A PREDOMINANTLY SEXUAL MODE OF TRANSMISSION: ICD-10-CM

## 2024-01-29 DIAGNOSIS — R68.82 DECREASED LIBIDO: ICD-10-CM

## 2024-01-29 DIAGNOSIS — Z13.220 ENCOUNTER FOR SCREENING FOR LIPOID DISORDERS: ICD-10-CM

## 2024-01-29 DIAGNOSIS — Z00.00 ENCOUNTER FOR GENERAL ADULT MEDICAL EXAMINATION W/OUT ABNORMAL FINDINGS: ICD-10-CM

## 2024-01-29 DIAGNOSIS — K21.9 GASTRO-ESOPHAGEAL REFLUX DISEASE W/OUT ESOPHAGITIS: ICD-10-CM

## 2024-01-29 PROCEDURE — 99396 PREV VISIT EST AGE 40-64: CPT

## 2024-01-29 PROCEDURE — 93000 ELECTROCARDIOGRAM COMPLETE: CPT

## 2024-01-29 PROCEDURE — 36415 COLL VENOUS BLD VENIPUNCTURE: CPT

## 2024-01-29 NOTE — HEALTH RISK ASSESSMENT
[Good] : ~his/her~  mood as  good [2 - 4 times a month (2 pts)] : 2-4 times a month (2 points) [1 or 2 (0 pts)] : 1 or 2 (0 points) [Never (0 pts)] : Never (0 points) [No falls in past year] : Patient reported no falls in the past year [0] : 2) Feeling down, depressed, or hopeless: Not at all (0) [PHQ-2 Negative - No further assessment needed] : PHQ-2 Negative - No further assessment needed [No Retinopathy] : No retinopathy [HIV Test offered] : HIV Test offered [Hepatitis C test offered] : Hepatitis C test offered [None] : None [With Significant Other] : lives with significant other [Employed] : employed [College] : College [] :  [# Of Children ___] : has [unfilled] children [Sexually Active] : sexually active [Feels Safe at Home] : Feels safe at home [Fully functional (bathing, dressing, toileting, transferring, walking, feeding)] : Fully functional (bathing, dressing, toileting, transferring, walking, feeding) [Fully functional (using the telephone, shopping, preparing meals, housekeeping, doing laundry, using] : Fully functional and needs no help or supervision to perform IADLs (using the telephone, shopping, preparing meals, housekeeping, doing laundry, using transportation, managing medications and managing finances) [Smoke Detector] : smoke detector [Carbon Monoxide Detector] : carbon monoxide detector [Seat Belt] :  uses seat belt [With Patient/Caregiver] : , with patient/caregiver [Aggressive treatment] : aggressive treatment [Former] : Former [> 15 Years] : > 15 Years [Yes] : In the past 12 months have you used drugs other than those required for medical reasons? 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, Podiatrist [Audit-CScore] : 1 [de-identified] : using marijuana daily [de-identified] : cardio, bike [de-identified] : meat, cheese, salads [[id number]] : 0 [EyeExamDate] : 2022 [Change in mental status noted] : No change in mental status noted [Language] : denies difficulty with language [Behavior] : denies difficulty with behavior [Handling Complex Tasks] : denies difficulty handling complex tasks [Reasoning] : denies difficulty with reasoning [Reports changes in hearing] : Reports no changes in hearing [Reports changes in vision] : Reports no changes in vision [ColonoscopyDate] : 12/09/2022 [AdvancecareDate] : 01/29/2024 [de-identified] : quit cigarettes 18 years ago; uses marijuana

## 2024-01-29 NOTE — PHYSICAL EXAM
[No Acute Distress] : no acute distress [Well Nourished] : well nourished [Well-Appearing] : well-appearing [Normal Sclera/Conjunctiva] : normal sclera/conjunctiva [PERRL] : pupils equal round and reactive to light [Normal Outer Ear/Nose] : the outer ears and nose were normal in appearance [Normal Oropharynx] : the oropharynx was normal [No JVD] : no jugular venous distention [No Respiratory Distress] : no respiratory distress  [Clear to Auscultation] : lungs were clear to auscultation bilaterally [Normal Rate] : normal rate  [Regular Rhythm] : with a regular rhythm [Normal S1, S2] : normal S1 and S2 [Soft] : abdomen soft [Non Tender] : non-tender [Non-distended] : non-distended [No Masses] : no abdominal mass palpated [Normal Bowel Sounds] : normal bowel sounds [No CVA Tenderness] : no CVA  tenderness [No Spinal Tenderness] : no spinal tenderness [Normal] : no CVA or spinal tenderness [No Joint Swelling] : no joint swelling [Grossly Normal Strength/Tone] : grossly normal strength/tone [No Rash] : no rash [Coordination Grossly Intact] : coordination grossly intact [Speech Grossly Normal] : speech grossly normal [Normal Affect] : the affect was normal [Normal Mood] : the mood was normal

## 2024-01-29 NOTE — REVIEW OF SYSTEMS
[Negative] : Heme/Lymph [Fever] : no fever [Chills] : no chills [Fatigue] : no fatigue [Night Sweats] : no night sweats [Discharge] : no discharge [Pain] : no pain [Dryness] : no dryness [Vision Problems] : no vision problems [Earache] : no earache [Hearing Loss] : no hearing loss [Nasal Discharge] : no nasal discharge [Sore Throat] : no sore throat [Chest Pain] : no chest pain [Palpitations] : no palpitations [Lower Ext Edema] : no lower extremity edema [Shortness Of Breath] : no shortness of breath [Wheezing] : no wheezing [Cough] : no cough [Dyspnea on Exertion] : not dyspnea on exertion [Abdominal Pain] : no abdominal pain [Nausea] : no nausea [Constipation] : no constipation [Diarrhea] : no diarrhea [Vomiting] : no vomiting [Dysuria] : no dysuria [Incontinence] : no incontinence [Hesitancy] : no hesitancy [Hematuria] : no hematuria [Frequency] : no frequency [Joint Pain] : no joint pain [Joint Stiffness] : no joint stiffness [Muscle Pain] : no muscle pain [Back Pain] : no back pain [Joint Swelling] : no joint swelling [Dizziness] : no dizziness [Memory Loss] : no memory loss [Suicidal] : not suicidal [Insomnia] : no insomnia [Anxiety] : no anxiety [Depression] : no depression [Easy Bleeding] : no easy bleeding [Easy Bruising] : no easy bruising

## 2024-01-29 NOTE — COUNSELING
[Fall prevention counseling provided] : Fall prevention counseling provided [Adequate lighting] : Adequate lighting [Use proper foot wear] : Use proper foot wear [Behavioral health counseling provided] : Behavioral health counseling provided [Engage in a relaxing activity] : Engage in a relaxing activity [Potential consequences of obesity discussed] : Potential consequences of obesity discussed [None] : None [Good understanding] : Patient has a good understanding of lifestyle changes and steps needed to achieve self management goal

## 2024-01-29 NOTE — HISTORY OF PRESENT ILLNESS
[FreeTextEntry1] : New patient to me Annual wellness visit  [de-identified] : Patient is a 43 year old male with Paroxysmal Afib s/p ablation (2020), Migraine presented here for annual wellness visit. Was following with Dr Lerner before this. C/o low energy for past few months. Refused to get Flu vaccine

## 2024-02-01 DIAGNOSIS — R31.29 OTHER MICROSCOPIC HEMATURIA: ICD-10-CM

## 2024-02-01 DIAGNOSIS — R79.89 OTHER SPECIFIED ABNORMAL FINDINGS OF BLOOD CHEMISTRY: ICD-10-CM

## 2024-02-01 LAB
ALBUMIN SERPL ELPH-MCNC: 4.6 G/DL
ALP BLD-CCNC: 72 U/L
ALT SERPL-CCNC: 31 U/L
ANION GAP SERPL CALC-SCNC: 15 MMOL/L
APPEARANCE: ABNORMAL
AST SERPL-CCNC: 19 U/L
BILIRUB SERPL-MCNC: 0.4 MG/DL
BILIRUBIN URINE: NEGATIVE
BLOOD URINE: ABNORMAL
BUN SERPL-MCNC: 29 MG/DL
CALCIUM SERPL-MCNC: 9 MG/DL
CHLORIDE SERPL-SCNC: 103 MMOL/L
CHOLEST SERPL-MCNC: 203 MG/DL
CO2 SERPL-SCNC: 22 MMOL/L
COLOR: YELLOW
CREAT SERPL-MCNC: 1.3 MG/DL
EGFR: 69 ML/MIN/1.73M2
GLUCOSE QUALITATIVE U: NEGATIVE MG/DL
GLUCOSE SERPL-MCNC: 87 MG/DL
HBV SURFACE AB SER QL: REACTIVE
HBV SURFACE AG SER QL: NONREACTIVE
HCT VFR BLD CALC: 44 %
HCV AB SER QL: NONREACTIVE
HCV S/CO RATIO: 0.44 S/CO
HDLC SERPL-MCNC: 41 MG/DL
HGB BLD-MCNC: 15.3 G/DL
HIV1+2 AB SPEC QL IA.RAPID: NONREACTIVE
KETONES URINE: NEGATIVE MG/DL
LDLC SERPL CALC-MCNC: 105 MG/DL
LEUKOCYTE ESTERASE URINE: NEGATIVE
MCHC RBC-ENTMCNC: 28.4 PG
MCHC RBC-ENTMCNC: 34.8 GM/DL
MCV RBC AUTO: 81.6 FL
NITRITE URINE: NEGATIVE
NONHDLC SERPL-MCNC: 161 MG/DL
PH URINE: 5.5
PLATELET # BLD AUTO: 234 K/UL
POTASSIUM SERPL-SCNC: 4.3 MMOL/L
PROT SERPL-MCNC: 7.2 G/DL
PROTEIN URINE: NEGATIVE MG/DL
PSA SERPL-MCNC: 0.53 NG/ML
RBC # BLD: 5.39 M/UL
RBC # FLD: 13 %
SODIUM SERPL-SCNC: 140 MMOL/L
SPECIFIC GRAVITY URINE: >1.03
T PALLIDUM AB SER QL IA: NEGATIVE
TESTOST FREE SERPL-MCNC: 2.9 PG/ML
TESTOST SERPL-MCNC: 121 NG/DL
TRIGL SERPL-MCNC: 330 MG/DL
TSH SERPL-ACNC: 1.08 UIU/ML
UROBILINOGEN URINE: 0.2 MG/DL
WBC # FLD AUTO: 6.53 K/UL

## 2024-02-08 ENCOUNTER — APPOINTMENT (OUTPATIENT)
Dept: ELECTROPHYSIOLOGY | Facility: CLINIC | Age: 44
End: 2024-02-08
Payer: COMMERCIAL

## 2024-02-08 VITALS
HEIGHT: 73 IN | OXYGEN SATURATION: 98 % | SYSTOLIC BLOOD PRESSURE: 139 MMHG | BODY MASS INDEX: 25.58 KG/M2 | WEIGHT: 193 LBS | DIASTOLIC BLOOD PRESSURE: 90 MMHG | HEART RATE: 78 BPM

## 2024-02-08 DIAGNOSIS — R00.2 PALPITATIONS: ICD-10-CM

## 2024-02-08 DIAGNOSIS — I48.0 PAROXYSMAL ATRIAL FIBRILLATION: ICD-10-CM

## 2024-02-08 PROCEDURE — G2211 COMPLEX E/M VISIT ADD ON: CPT | Mod: NC,1L

## 2024-02-08 PROCEDURE — 99214 OFFICE O/P EST MOD 30 MIN: CPT | Mod: 25

## 2024-02-08 PROCEDURE — 93285 PRGRMG DEV EVAL SCRMS IP: CPT

## 2024-03-20 PROBLEM — I48.0 PAROXYSMAL ATRIAL FIBRILLATION: Status: ACTIVE | Noted: 2021-01-13

## 2024-03-20 PROBLEM — R00.2 INTERMITTENT PALPITATIONS: Status: ACTIVE | Noted: 2019-12-03

## 2024-03-20 NOTE — CARDIOLOGY SUMMARY
[de-identified] : 2/25/2019 : TTE \par  Normal LV size and function EF 65-70%  trace MR, mild TR [de-identified] : 12/24/20 : \par  \par  Exercise Data: \par  Exercise Protocol: Rex \par  Peak heart rate: 163 \par  Peak blood pressure: 166/90 \par  Reason for terminating: fatigue \par  Symptoms: no chest pain \par  Arrhythmias: yes Frequent PAC's, rare PVC's during recovery. Rare PAC's during exercise. \par  Patient achieved 92 percent of the maximum predicted heart rate \par  \par  \par  1. Excellent exercise tolerance. \par  2. Normal ECG response to treadmill exercise. \par

## 2024-03-20 NOTE — HISTORY OF PRESENT ILLNESS
[None] : The patient complains of no symptoms [FreeTextEntry1] : Mr. Wilkins is a 42 year old man with paroxysmal atrial fibrillation s/ post [facility] PVI 4/14/2021. He has had no symptomatic episodes since. He has been off antiarrhythmics  without any arrhythmia recurrence. JAVIER WILKINS  denies chest pain, chest pressure, shortness of breath, lightheadedness, dizziness, palpitations, syncope, presyncope, orthopnea, PND, or edema.

## 2024-03-20 NOTE — PHYSICAL EXAM
[Well Developed] : well developed [Well Nourished] : well nourished [No Acute Distress] : no acute distress [Normal Conjunctiva] : normal conjunctiva [Normal Venous Pressure] : normal venous pressure [Normal S1, S2] : normal S1, S2 [No Carotid Bruit] : no carotid bruit [No Murmur] : no murmur [No Rub] : no rub [No Gallop] : no gallop [Clear Lung Fields] : clear lung fields [No Respiratory Distress] : no respiratory distress  [Good Air Entry] : good air entry [Non Tender] : non-tender [Soft] : abdomen soft [No Masses/organomegaly] : no masses/organomegaly [Normal Bowel Sounds] : normal bowel sounds [No Cyanosis] : no cyanosis [No Edema] : no edema [Normal Gait] : normal gait [No Varicosities] : no varicosities [No Clubbing] : no clubbing [No Rash] : no rash [No Skin Lesions] : no skin lesions [No Focal Deficits] : no focal deficits [Moves all extremities] : moves all extremities [Alert and Oriented] : alert and oriented [Normal Speech] : normal speech [Normal memory] : normal memory

## 2024-03-20 NOTE — ASSESSMENT
[FreeTextEntry1] : This is a 43-year-old man with paroxysmal atrial fibrillation s/p [facility] PVI. Implantable cardiac monitor demonstrates no recurrent atrial fibrilation.  CHADS2-VASc score 0, his off  anticoagulation and no longer on antiarrhythmics.   ILR interrogation today shows sinus rhythm, without any arrhythmia recurrence.     1) Atrial fibrillation : will continue monitoring ILR in office twice yearly.  2) Hypertension: BP elevated today. He reports not being compliant with exercise and diet. Discussed the option of initiating medications. He will watch diet. He is scheduling a annual physical with his primary.  3) Hypertlipidemia: Will also watch diet. If remains above goal would consider low dose statin.

## 2024-11-27 NOTE — PROCEDURE NOTE - NSICDXPROCEDURE_GEN_ALL_CORE_FT
Chief Complaint   Patient presents with    Diabetes     Follow up    Hyperlipidemia     Follow up    Pharyngitis     X 1 day         \"Have you been to the ER, urgent care clinic since your last visit?  Hospitalized since your last visit?\"    NO    “Have you seen or consulted any other health care providers outside of [facility] since your last visit?”    NO     “Have you had a pap smear?”    NO    Date of last Cervical Cancer screen (HPV or PAP): 12/18/2015         “Have you had a colorectal cancer screening such as a colonoscopy/FIT/Cologuard?    NO    Date of last Colonoscopy: 1/4/2016  No cologuard on file  No FIT/FOBT on file   No flexible sigmoidoscopy on file         Click Here for Release of Records Request           11/27/2024     9:37 AM   PHQ-9    Little interest or pleasure in doing things 0   Feeling down, depressed, or hopeless 0   PHQ-2 Score 0   PHQ-9 Total Score 0           Financial Resource Strain: Low Risk  (11/27/2024)    Overall Financial Resource Strain (CARDIA)     Difficulty of Paying Living Expenses: Not hard at all      Food Insecurity: No Food Insecurity (11/27/2024)    Hunger Vital Sign     Worried About Running Out of Food in the Last Year: Never true     Ran Out of Food in the Last Year: Never true          Health Maintenance Due   Topic Date Due    Hepatitis B vaccine (1 of 3 - 19+ 3-dose series) Never done    Cervical cancer screen  12/18/2020    Colorectal Cancer Screen  01/04/2021    Flu vaccine (1) 08/01/2024    Diabetic retinal exam  08/02/2024         PROCEDURES:  Replacement, loop recorder 05-Feb-2021 14:19:32  Thien Red

## 2025-02-06 ENCOUNTER — APPOINTMENT (OUTPATIENT)
Dept: ELECTROPHYSIOLOGY | Facility: CLINIC | Age: 45
End: 2025-02-06
Payer: COMMERCIAL

## 2025-02-06 VITALS
OXYGEN SATURATION: 100 % | WEIGHT: 185 LBS | SYSTOLIC BLOOD PRESSURE: 126 MMHG | DIASTOLIC BLOOD PRESSURE: 91 MMHG | HEIGHT: 73 IN | HEART RATE: 63 BPM | BODY MASS INDEX: 24.52 KG/M2

## 2025-02-06 VITALS — DIASTOLIC BLOOD PRESSURE: 86 MMHG | SYSTOLIC BLOOD PRESSURE: 126 MMHG

## 2025-02-06 DIAGNOSIS — I48.0 PAROXYSMAL ATRIAL FIBRILLATION: ICD-10-CM

## 2025-02-06 PROCEDURE — 99214 OFFICE O/P EST MOD 30 MIN: CPT | Mod: 25

## 2025-02-06 PROCEDURE — 93285 PRGRMG DEV EVAL SCRMS IP: CPT

## 2025-02-12 ENCOUNTER — APPOINTMENT (OUTPATIENT)
Dept: INTERNAL MEDICINE | Facility: CLINIC | Age: 45
End: 2025-02-12
Payer: COMMERCIAL

## 2025-02-12 ENCOUNTER — NON-APPOINTMENT (OUTPATIENT)
Age: 45
End: 2025-02-12

## 2025-02-12 VITALS
OXYGEN SATURATION: 99 % | SYSTOLIC BLOOD PRESSURE: 110 MMHG | RESPIRATION RATE: 14 BRPM | BODY MASS INDEX: 24.52 KG/M2 | DIASTOLIC BLOOD PRESSURE: 78 MMHG | HEART RATE: 52 BPM | WEIGHT: 185 LBS | TEMPERATURE: 98 F | HEIGHT: 73 IN

## 2025-02-12 DIAGNOSIS — R31.29 OTHER MICROSCOPIC HEMATURIA: ICD-10-CM

## 2025-02-12 DIAGNOSIS — Z00.00 ENCOUNTER FOR GENERAL ADULT MEDICAL EXAMINATION W/OUT ABNORMAL FINDINGS: ICD-10-CM

## 2025-02-12 DIAGNOSIS — Z13.29 ENCOUNTER FOR SCREENING FOR OTHER SUSPECTED ENDOCRINE DISORDER: ICD-10-CM

## 2025-02-12 DIAGNOSIS — R68.82 DECREASED LIBIDO: ICD-10-CM

## 2025-02-12 DIAGNOSIS — R79.89 OTHER SPECIFIED ABNORMAL FINDINGS OF BLOOD CHEMISTRY: ICD-10-CM

## 2025-02-12 DIAGNOSIS — E55.9 VITAMIN D DEFICIENCY, UNSPECIFIED: ICD-10-CM

## 2025-02-12 DIAGNOSIS — Z12.5 ENCOUNTER FOR SCREENING FOR MALIGNANT NEOPLASM OF PROSTATE: ICD-10-CM

## 2025-02-12 DIAGNOSIS — Z13.220 ENCOUNTER FOR SCREENING FOR LIPOID DISORDERS: ICD-10-CM

## 2025-02-12 PROCEDURE — 93000 ELECTROCARDIOGRAM COMPLETE: CPT

## 2025-02-12 PROCEDURE — 99396 PREV VISIT EST AGE 40-64: CPT

## 2025-02-12 PROCEDURE — 36415 COLL VENOUS BLD VENIPUNCTURE: CPT

## 2025-02-13 ENCOUNTER — NON-APPOINTMENT (OUTPATIENT)
Age: 45
End: 2025-02-13

## 2025-02-13 LAB
25(OH)D3 SERPL-MCNC: 24.8 NG/ML
ALBUMIN SERPL ELPH-MCNC: 4.5 G/DL
ALP BLD-CCNC: 89 U/L
ALT SERPL-CCNC: 26 U/L
ANION GAP SERPL CALC-SCNC: 11 MMOL/L
AST SERPL-CCNC: 23 U/L
BILIRUB SERPL-MCNC: 0.7 MG/DL
BUN SERPL-MCNC: 28 MG/DL
CALCIUM SERPL-MCNC: 9.4 MG/DL
CHLORIDE SERPL-SCNC: 102 MMOL/L
CHOLEST SERPL-MCNC: 178 MG/DL
CO2 SERPL-SCNC: 26 MMOL/L
CREAT SERPL-MCNC: 1.2 MG/DL
EGFR: 76 ML/MIN/1.73M2
FOLATE SERPL-MCNC: 6.5 NG/ML
GLUCOSE SERPL-MCNC: 93 MG/DL
HCT VFR BLD CALC: 43.1 %
HDLC SERPL-MCNC: 59 MG/DL
HGB BLD-MCNC: 14.5 G/DL
LDLC SERPL CALC-MCNC: 105 MG/DL
MCHC RBC-ENTMCNC: 28.5 PG
MCHC RBC-ENTMCNC: 33.6 G/DL
MCV RBC AUTO: 84.8 FL
NONHDLC SERPL-MCNC: 119 MG/DL
PLATELET # BLD AUTO: 190 K/UL
POTASSIUM SERPL-SCNC: 4.5 MMOL/L
PROT SERPL-MCNC: 7.1 G/DL
PSA SERPL-MCNC: 0.55 NG/ML
RBC # BLD: 5.08 M/UL
RBC # FLD: 13.8 %
SODIUM SERPL-SCNC: 139 MMOL/L
TRIGL SERPL-MCNC: 80 MG/DL
TSH SERPL-ACNC: 1.65 UIU/ML
VIT B12 SERPL-MCNC: 418 PG/ML
WBC # FLD AUTO: 4.84 K/UL

## 2025-02-14 LAB
TESTOST FREE SERPL-MCNC: 5.6 PG/ML
TESTOST SERPL-MCNC: 441 NG/DL